# Patient Record
Sex: FEMALE | NOT HISPANIC OR LATINO | Employment: OTHER | URBAN - METROPOLITAN AREA
[De-identification: names, ages, dates, MRNs, and addresses within clinical notes are randomized per-mention and may not be internally consistent; named-entity substitution may affect disease eponyms.]

---

## 2023-08-11 ENCOUNTER — LAB (OUTPATIENT)
Dept: LAB | Facility: HOSPITAL | Age: 63
End: 2023-08-11
Payer: MEDICARE

## 2023-08-11 ENCOUNTER — OFFICE VISIT (OUTPATIENT)
Dept: NEUROLOGY | Facility: CLINIC | Age: 63
End: 2023-08-11
Payer: MEDICARE

## 2023-08-11 VITALS
HEIGHT: 68 IN | WEIGHT: 110.4 LBS | SYSTOLIC BLOOD PRESSURE: 141 MMHG | BODY MASS INDEX: 16.73 KG/M2 | DIASTOLIC BLOOD PRESSURE: 80 MMHG | HEART RATE: 63 BPM | OXYGEN SATURATION: 100 %

## 2023-08-11 DIAGNOSIS — I67.82 CEREBRAL ISCHEMIA: ICD-10-CM

## 2023-08-11 DIAGNOSIS — M32.9 LUPUS: Primary | ICD-10-CM

## 2023-08-11 DIAGNOSIS — Z11.59 ENCOUNTER FOR SCREENING FOR OTHER VIRAL DISEASES: ICD-10-CM

## 2023-08-11 DIAGNOSIS — I69.30 SEQUELAE, POST-STROKE: ICD-10-CM

## 2023-08-11 DIAGNOSIS — I63.89 OTHER CEREBRAL INFARCTION: ICD-10-CM

## 2023-08-11 DIAGNOSIS — Z11.4 ENCOUNTER FOR SCREENING FOR HUMAN IMMUNODEFICIENCY VIRUS (HIV): ICD-10-CM

## 2023-08-11 DIAGNOSIS — M32.9 LUPUS: ICD-10-CM

## 2023-08-11 PROBLEM — M35.00 H/O SJOGREN'S DISEASE: Status: ACTIVE | Noted: 2023-04-12

## 2023-08-11 PROBLEM — G25.81 RESTLESS LEGS SYNDROME: Status: ACTIVE | Noted: 2023-02-09

## 2023-08-11 PROBLEM — M10.9 GOUT: Status: ACTIVE | Noted: 2023-04-12

## 2023-08-11 PROBLEM — G43.009 MIGRAINE WITHOUT AURA AND WITHOUT STATUS MIGRAINOSUS, NOT INTRACTABLE: Status: ACTIVE | Noted: 2023-02-09

## 2023-08-11 PROBLEM — G35 MULTIPLE SCLEROSIS: Chronic | Status: ACTIVE | Noted: 2021-07-30

## 2023-08-11 PROBLEM — M79.7 FIBROMYALGIA: Status: ACTIVE | Noted: 2023-04-12

## 2023-08-11 PROBLEM — G62.9 NEUROPATHY: Status: ACTIVE | Noted: 2023-04-12

## 2023-08-11 PROBLEM — I63.9 CEREBROVASCULAR ACCIDENT (CVA): Chronic | Status: ACTIVE | Noted: 2021-07-04

## 2023-08-11 PROBLEM — K21.9 GASTROESOPHAGEAL REFLUX DISEASE: Status: ACTIVE | Noted: 2023-02-09

## 2023-08-11 PROBLEM — G56.03 BILATERAL CARPAL TUNNEL SYNDROME: Status: ACTIVE | Noted: 2020-04-13

## 2023-08-11 PROBLEM — Z79.899 HIGH RISK MEDICATION USE: Status: ACTIVE | Noted: 2023-02-09

## 2023-08-11 PROBLEM — D64.9 ANEMIA: Status: ACTIVE | Noted: 2019-10-10

## 2023-08-11 PROBLEM — M25.569 KNEE PAIN: Status: ACTIVE | Noted: 2020-12-14

## 2023-08-11 PROBLEM — M17.11 PRIMARY LOCALIZED OSTEOARTHRITIS OF RIGHT KNEE: Status: ACTIVE | Noted: 2021-04-12

## 2023-08-11 PROBLEM — R59.9 LYMPH NODES ENLARGED: Status: ACTIVE | Noted: 2023-06-08

## 2023-08-11 PROBLEM — G45.1 CAROTID ARTERY INSUFFICIENCY SYNDROME: Status: ACTIVE | Noted: 2021-07-04

## 2023-08-11 PROBLEM — G62.9 POLYNEUROPATHY: Status: ACTIVE | Noted: 2020-04-13

## 2023-08-11 PROBLEM — E53.8 B12 DEFICIENCY: Status: ACTIVE | Noted: 2023-02-09

## 2023-08-11 PROBLEM — M19.90 ARTHRITIS: Status: ACTIVE | Noted: 2020-12-14

## 2023-08-11 PROBLEM — R20.0 BILATERAL HAND NUMBNESS: Status: ACTIVE | Noted: 2023-02-09

## 2023-08-11 LAB
ALBUMIN SERPL-MCNC: 4.5 G/DL (ref 3.5–5.2)
ALBUMIN/GLOB SERPL: 2 G/DL
ALP SERPL-CCNC: 93 U/L (ref 39–117)
ALT SERPL W P-5'-P-CCNC: 21 U/L (ref 1–33)
ANION GAP SERPL CALCULATED.3IONS-SCNC: 8.2 MMOL/L (ref 5–15)
AST SERPL-CCNC: 32 U/L (ref 1–32)
BASOPHILS # BLD AUTO: 0.01 10*3/MM3 (ref 0–0.2)
BASOPHILS NFR BLD AUTO: 0.2 % (ref 0–1.5)
BILIRUB SERPL-MCNC: 0.4 MG/DL (ref 0–1.2)
BUN SERPL-MCNC: 6 MG/DL (ref 8–23)
BUN/CREAT SERPL: 9 (ref 7–25)
CALCIUM SPEC-SCNC: 9.2 MG/DL (ref 8.6–10.5)
CHLORIDE SERPL-SCNC: 109 MMOL/L (ref 98–107)
CHOLEST SERPL-MCNC: 213 MG/DL (ref 0–200)
CO2 SERPL-SCNC: 24.8 MMOL/L (ref 22–29)
CREAT SERPL-MCNC: 0.67 MG/DL (ref 0.57–1)
CRP SERPL-MCNC: <0.3 MG/DL (ref 0–0.5)
DEPRECATED RDW RBC AUTO: 52.9 FL (ref 37–54)
EGFRCR SERPLBLD CKD-EPI 2021: 98.4 ML/MIN/1.73
EOSINOPHIL # BLD AUTO: 0.15 10*3/MM3 (ref 0–0.4)
EOSINOPHIL NFR BLD AUTO: 3.7 % (ref 0.3–6.2)
ERYTHROCYTE [DISTWIDTH] IN BLOOD BY AUTOMATED COUNT: 14.5 % (ref 12.3–15.4)
ERYTHROCYTE [SEDIMENTATION RATE] IN BLOOD: 2 MM/HR (ref 0–30)
GLOBULIN UR ELPH-MCNC: 2.2 GM/DL
GLUCOSE SERPL-MCNC: 81 MG/DL (ref 65–99)
HBV SURFACE AB SER RIA-ACNC: NORMAL
HBV SURFACE AG SERPL QL IA: NORMAL
HCT VFR BLD AUTO: 38 % (ref 34–46.6)
HCV AB SER DONR QL: NORMAL
HDLC SERPL-MCNC: 93 MG/DL (ref 40–60)
HGB BLD-MCNC: 12.1 G/DL (ref 12–15.9)
HIV1+2 AB SER QL: NORMAL
IMM GRANULOCYTES # BLD AUTO: 0.01 10*3/MM3 (ref 0–0.05)
IMM GRANULOCYTES NFR BLD AUTO: 0.2 % (ref 0–0.5)
LDLC SERPL CALC-MCNC: 106 MG/DL (ref 0–100)
LDLC/HDLC SERPL: 1.11 {RATIO}
LYMPHOCYTES # BLD AUTO: 1.21 10*3/MM3 (ref 0.7–3.1)
LYMPHOCYTES NFR BLD AUTO: 29.5 % (ref 19.6–45.3)
MCH RBC QN AUTO: 31.5 PG (ref 26.6–33)
MCHC RBC AUTO-ENTMCNC: 31.8 G/DL (ref 31.5–35.7)
MCV RBC AUTO: 99 FL (ref 79–97)
MONOCYTES # BLD AUTO: 0.32 10*3/MM3 (ref 0.1–0.9)
MONOCYTES NFR BLD AUTO: 7.8 % (ref 5–12)
NEUTROPHILS NFR BLD AUTO: 2.4 10*3/MM3 (ref 1.7–7)
NEUTROPHILS NFR BLD AUTO: 58.6 % (ref 42.7–76)
PLATELET # BLD AUTO: 247 10*3/MM3 (ref 140–450)
PMV BLD AUTO: 9.9 FL (ref 6–12)
POTASSIUM SERPL-SCNC: 3.6 MMOL/L (ref 3.5–5.2)
PROT SERPL-MCNC: 6.7 G/DL (ref 6–8.5)
RBC # BLD AUTO: 3.84 10*6/MM3 (ref 3.77–5.28)
SODIUM SERPL-SCNC: 142 MMOL/L (ref 136–145)
TRIGL SERPL-MCNC: 82 MG/DL (ref 0–150)
VLDLC SERPL-MCNC: 14 MG/DL (ref 5–40)
WBC NRBC COR # BLD: 4.1 10*3/MM3 (ref 3.4–10.8)

## 2023-08-11 PROCEDURE — 85652 RBC SED RATE AUTOMATED: CPT

## 2023-08-11 PROCEDURE — 86803 HEPATITIS C AB TEST: CPT

## 2023-08-11 PROCEDURE — 80053 COMPREHEN METABOLIC PANEL: CPT

## 2023-08-11 PROCEDURE — 85025 COMPLETE CBC W/AUTO DIFF WBC: CPT

## 2023-08-11 PROCEDURE — 86706 HEP B SURFACE ANTIBODY: CPT

## 2023-08-11 PROCEDURE — 87340 HEPATITIS B SURFACE AG IA: CPT

## 2023-08-11 PROCEDURE — 86362 MOG-IGG1 ANTB CBA EACH: CPT

## 2023-08-11 PROCEDURE — 86704 HEP B CORE ANTIBODY TOTAL: CPT

## 2023-08-11 PROCEDURE — G0432 EIA HIV-1/HIV-2 SCREEN: HCPCS

## 2023-08-11 PROCEDURE — 86140 C-REACTIVE PROTEIN: CPT

## 2023-08-11 PROCEDURE — 36415 COLL VENOUS BLD VENIPUNCTURE: CPT

## 2023-08-11 PROCEDURE — 86053 AQAPRN-4 ANTB FLO CYTMTRY EA: CPT

## 2023-08-11 PROCEDURE — 80061 LIPID PANEL: CPT

## 2023-08-11 RX ORDER — ALLOPURINOL 300 MG/1
1 TABLET ORAL DAILY
COMMUNITY

## 2023-08-11 RX ORDER — GABAPENTIN 300 MG/1
300 CAPSULE ORAL DAILY
COMMUNITY
Start: 2023-06-08

## 2023-08-11 RX ORDER — PYRIDOSTIGMINE BROMIDE 60 MG/1
180 TABLET ORAL DAILY
COMMUNITY
Start: 2023-06-08

## 2023-08-11 RX ORDER — ATORVASTATIN CALCIUM 20 MG/1
20 TABLET, FILM COATED ORAL DAILY
Qty: 30 TABLET | Refills: 11 | Status: SHIPPED | OUTPATIENT
Start: 2023-08-11 | End: 2024-08-10

## 2023-08-11 RX ORDER — TEMAZEPAM 15 MG/1
15 CAPSULE ORAL NIGHTLY PRN
COMMUNITY

## 2023-08-11 RX ORDER — PANTOPRAZOLE SODIUM 40 MG/1
2 TABLET, DELAYED RELEASE ORAL DAILY
COMMUNITY

## 2023-08-11 RX ORDER — HYDROXYCHLOROQUINE SULFATE 200 MG/1
1 TABLET, FILM COATED ORAL DAILY
COMMUNITY

## 2023-08-11 NOTE — PROGRESS NOTES
This is a very pleasant 63-year-old female who I am seeing today in initial consultation for lupus and a previous stroke in the brain.  She had neurologic involvement in her lupus as well as kidney disease.  She is currently on methotrexate and rituximab.  She states she has been on these medications for the last 2 years and is tolerated these medications well.  She states that about a year ago she started develop some numbness in both of her distal lower extremities and was diagnosed with a peripheral neuropathy.  This was thought to be secondary to her lupus.  Besides the peripheral neuropathy she states she has been relatively stable on her rituximab and methotrexate.  She is here today for second opinion regarding disease modifying therapy.        Past medical history    Lupus    Sjogren syndrome    B12 deficiency    Gout    Arthritis    Previous CVA    Bilateral carpal tunnel        Social history    No alcohol tobacco or illicit drug use        Family history    There is no family history of autoimmune disease or neuro immune conditions but she does have a strong family history of dementia including her father and multiple uncles and aunts that had dementia through her father side        She on examination today she is alert and oriented x 3.  Her speech is fluent there is no dysarthria no aphasia.  Cranial nerves II through XII are intact she has 5 out of 5 strength in bilateral upper and lower extremities with normal tone and bulk there are no upper motor neuron signs no ataxia finger-nose or heel-to-shin.  Rapid alternating movements including hand fisting and finger tapping show normal speed and amplitude.  Sensations intact in her upper extremities she has decreased sensation in both of her distal lower extremities this improves approximately above the knee.  She was able to walk 25 feet and 4 seconds.        I was able to review her most recent MRI of the brain which showed hyperintense lesion in the  right frontal parietal region this is consistent with an old ischemic infarct I do not see any active restricted diffusion I reviewed her lumbar puncture results.  She had 2 nucleated cells her glucose was 48 protein was 45 she had 7 oligoclonal bands in the spinal fluid although there was no match serum sample.  She had a West Nile IgG that was positive although her IgM was negative.  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  I agree with continuing the rituximab and the methotrexate for now.  I am going to check routine blood work today.  I am going to set her up for repeat imaging in 3 months including an MRI of the brain and spinal cord as well as a vascular imaging and an echo.  She will continue aspirin.  I would like her to start Lipitor.  She will follow-up with me at the conclusion of testing or sooner if there is any problems in the interim.  She will continue to follow-up with rheumatology.  I am happy to discuss these results with her primary rheumatologist and Dr. Felder her neurologist.        I spent 1 hour in patient care.        Thank you again for involving me in the care of this patient please not hesitate to contact me with additional questions.

## 2023-08-13 LAB — HBV CORE AB SERPL QL IA: NEGATIVE

## 2023-08-14 ENCOUNTER — TELEPHONE (OUTPATIENT)
Dept: NEUROLOGY | Facility: CLINIC | Age: 63
End: 2023-08-14

## 2023-08-14 NOTE — TELEPHONE ENCOUNTER
Caller: TROY  Relationship to Patient: SELF  Phone Number: [phone]    Reason for Call: SHE REVIEWED THE OV NOTE FROM HER LAST VISIT SHE WANTS TO MAKE SURE THAT HER AND PROVIDER ARE ON THE SAME PAGE. HER CONCERNS ARE AS FOLLOWS:    - IS SHE BEING TREATED FOR MS (DX'D IN JULY 2021) AND MG (DX'D OVER 18 YEARS AGO)    - PT HAS BEEN TREATED FOR NEUROPATHY FOR THE PAST 18 YEARS NOT TWO YEARS.    -PT HAS BEEN ON methotrexate FOR AT LEAST 18 YEARS FOR HER LUPUS.     - SHE STATES SHE DOES A FAMILY HISTORY OF MS - (TWO COUSINS AND ONE HAS PASSED) AND FAMILY HISTORY OF LUPUS ON FATHER SIDE(AN AUNT AND TWO COUSIN)

## 2023-08-14 NOTE — TELEPHONE ENCOUNTER
PLEASE SEE MESSAGE BELOW.     PT REVIEWED YOUR OV NOTES ON MYCHART AND NOTED SOME INCONSISTENCIES WITH YOUR DOCUMENTATION AND HER HISTORY.     -CLR

## 2023-08-15 LAB — MOG AB SER QL CBA IFA: NEGATIVE

## 2023-08-18 LAB — AQP4 H2O CHANNEL IGG SERPL QL: NEGATIVE

## 2023-08-21 ENCOUNTER — TELEPHONE (OUTPATIENT)
Dept: NEUROLOGY | Facility: CLINIC | Age: 63
End: 2023-08-21
Payer: MEDICARE

## 2023-08-21 NOTE — TELEPHONE ENCOUNTER
Working on overdue results. Pts imaging studies appeared.     Reached out to pt, she stated she had 3 scans done on 8/10 and has the other 3 scheduled for 8/25.     Called [facility]. Spoke with Medical records staff Mago. She confirmed pt had MRI Brain, MRI angiogram head and neck completed on 8/10 and on 8/25 pt is scheduled for Mri c and t spine and echo.     Mago is faxing over reports for completed scans. She also made herself a note and will fax over reports of scans being on on 8/25 once completed.     -clr

## 2023-08-30 ENCOUNTER — TELEPHONE (OUTPATIENT)
Dept: NEUROLOGY | Facility: CLINIC | Age: 63
End: 2023-08-30

## 2023-08-30 ENCOUNTER — OFFICE VISIT (OUTPATIENT)
Dept: NEUROLOGY | Facility: CLINIC | Age: 63
End: 2023-08-30
Payer: MEDICARE

## 2023-08-30 VITALS
HEART RATE: 71 BPM | HEIGHT: 68 IN | DIASTOLIC BLOOD PRESSURE: 71 MMHG | OXYGEN SATURATION: 100 % | SYSTOLIC BLOOD PRESSURE: 146 MMHG | BODY MASS INDEX: 16.61 KG/M2 | WEIGHT: 109.6 LBS

## 2023-08-30 DIAGNOSIS — M32.9 LUPUS: Primary | ICD-10-CM

## 2023-08-30 RX ORDER — TERBINAFINE HYDROCHLORIDE 250 MG/1
1 TABLET ORAL DAILY
COMMUNITY
Start: 2023-08-12

## 2023-08-30 RX ORDER — ASPIRIN 81 MG/1
81 TABLET ORAL DAILY
COMMUNITY

## 2023-08-30 NOTE — TELEPHONE ENCOUNTER
----- Message from Chay Aleman DO sent at 8/30/2023  1:56 PM EDT -----  She needs to do asa 81 mg as long as she is not on any other antiplatelet or anticoagulation

## 2023-08-30 NOTE — PROGRESS NOTES
This is a very pleasant 63-year-old female who had seen in initial consultation for multiple sclerosis and lupus.  She was diagnosed with both diseases and started on rituximab and methotrexate.  It was unclear to me whether she had both diseases or if this was all secondary to her neuro lupus.  She is here today to review imaging she reports no new neurologic issues since I saw her last.    She had a negative neuromyelitis optica antibody and negative MOG.  Her sed rate and CRP were both unremarkable.      I reviewed the MRI of the head neck which shows no major vascular occlusion.  I reviewed her echocardiogram which showed no thrombus.  Her MRI of the brain showed no acute intercranial abnormalities there is an area of encephalomalacia in the right frontal parietal region consistent with an old infarct and redemonstration of mild presumed small vessel ischemic disease.  Her MRI of the cervical cord showed a broad-based disc osteophyte complex with spurring with mild to moderate right-sided neural foraminal narrowing slightly worse than the prior exam her MRI of the thoracic cord showed acute superior endplate compression fractures at T9 and T12 in with minimal retropulsion no canal stenosis no lesions within the cord itself.        Unfortunately I was not able to review any of these images but only the reports she did bring a disc to today's visit but they were not able to open on our system I am still trying to get them open.        In summary this very pleasant 63-year-old female with lupus and not sure if she has multiple sclerosis as well I would like to see the imaging before making any final decisions.  This possibly could be all due to her neuro lupus.  Regardless she is on rituximab which has brought immunological coverage she should continue the rituximab and methotrexate.  She will continue aspirin and Lipitor.  I offered her neurosurgical consultation for the degenerative disc disease in the  compression fracture she is also ready followed for the compression fractures she would prefer not to consider any surgical intervention at this point.  She will follow-up w in 6 months or sooner if there is any problems in the interim.          I spent 40 minutes in patient care.

## 2024-06-17 NOTE — TELEPHONE ENCOUNTER
Rx Refill Note  Requested Prescriptions     Pending Prescriptions Disp Refills    Dietary Management Product (Rheumate) capsule 180 capsule 0     Sig: Take 1 capsule by mouth 2 (Two) Times a Day.      Last office visit with prescribing clinician: 02/28/2024     Last telemedicine visit with prescribing clinician: Visit date not found   Next office visit with prescribing clinician: 7/8/2024                         Would you like a call back once the refill request has been completed: [] Yes [] No    If the office needs to give you a call back, can they leave a voicemail: [] Yes [] No    Tavares Carter MA  06/17/24, 11:31 EDT    Last labs 12/21/22

## 2024-06-17 NOTE — TELEPHONE ENCOUNTER
Pt sent message via [employer] asking for a rx for Rheumate to be sent to [facility]. -MARISELA Blanco

## 2024-06-22 RX ORDER — ME-TETRAHYDROFOLATE/B12/HRB236 1-1-500 MG
1 CAPSULE ORAL 2 TIMES DAILY
Qty: 180 CAPSULE | Refills: 1 | Status: SHIPPED | OUTPATIENT
Start: 2024-06-22

## 2024-07-05 PROBLEM — M32.9 LUPUS: Status: ACTIVE | Noted: 2024-07-05

## 2024-07-05 PROBLEM — IMO0002 LUPUS: Status: ACTIVE | Noted: 2024-07-05

## 2024-07-08 ENCOUNTER — LAB (OUTPATIENT)
Facility: HOSPITAL | Age: 64
End: 2024-07-08
Payer: MEDICARE

## 2024-07-08 ENCOUNTER — OFFICE VISIT (OUTPATIENT)
Age: 64
End: 2024-07-08
Payer: MEDICARE

## 2024-07-08 VITALS
HEIGHT: 68 IN | BODY MASS INDEX: 16.37 KG/M2 | TEMPERATURE: 97.7 F | DIASTOLIC BLOOD PRESSURE: 68 MMHG | SYSTOLIC BLOOD PRESSURE: 120 MMHG | WEIGHT: 108 LBS | HEART RATE: 68 BPM

## 2024-07-08 DIAGNOSIS — G35 MULTIPLE SCLEROSIS: Chronic | ICD-10-CM

## 2024-07-08 DIAGNOSIS — M79.7 FIBROMYALGIA: ICD-10-CM

## 2024-07-08 DIAGNOSIS — M06.09 RHEUMATOID ARTHRITIS OF MULTIPLE SITES WITH NEGATIVE RHEUMATOID FACTOR: ICD-10-CM

## 2024-07-08 DIAGNOSIS — R21 RASH: ICD-10-CM

## 2024-07-08 DIAGNOSIS — M32.9 LUPUS: ICD-10-CM

## 2024-07-08 DIAGNOSIS — Z79.52 LONG TERM (CURRENT) USE OF SYSTEMIC STEROIDS: ICD-10-CM

## 2024-07-08 DIAGNOSIS — G89.29 CHRONIC RIGHT-SIDED LOW BACK PAIN WITHOUT SCIATICA: ICD-10-CM

## 2024-07-08 DIAGNOSIS — M06.09 RHEUMATOID ARTHRITIS OF MULTIPLE SITES WITH NEGATIVE RHEUMATOID FACTOR: Primary | ICD-10-CM

## 2024-07-08 DIAGNOSIS — I73.00 RAYNAUD'S DISEASE WITHOUT GANGRENE: ICD-10-CM

## 2024-07-08 DIAGNOSIS — Z79.899 HIGH RISK MEDICATION USE: ICD-10-CM

## 2024-07-08 DIAGNOSIS — M54.50 CHRONIC RIGHT-SIDED LOW BACK PAIN WITHOUT SCIATICA: ICD-10-CM

## 2024-07-08 DIAGNOSIS — R53.83 OTHER FATIGUE: ICD-10-CM

## 2024-07-08 DIAGNOSIS — M35.00 SJOGREN'S SYNDROME, WITH UNSPECIFIED ORGAN INVOLVEMENT: ICD-10-CM

## 2024-07-08 DIAGNOSIS — Q78.2 OSTEOPETROSIS: ICD-10-CM

## 2024-07-08 LAB
ALBUMIN SERPL-MCNC: 4.4 G/DL (ref 3.5–5.2)
ALBUMIN/GLOB SERPL: 1.9 G/DL
ALP SERPL-CCNC: 74 U/L (ref 39–117)
ALT SERPL W P-5'-P-CCNC: 27 U/L (ref 1–33)
ANION GAP SERPL CALCULATED.3IONS-SCNC: 12 MMOL/L (ref 5–15)
AST SERPL-CCNC: 43 U/L (ref 1–32)
BASOPHILS # BLD AUTO: 0.02 10*3/MM3 (ref 0–0.2)
BASOPHILS NFR BLD AUTO: 0.5 % (ref 0–1.5)
BILIRUB SERPL-MCNC: 0.5 MG/DL (ref 0–1.2)
BUN SERPL-MCNC: 5 MG/DL (ref 8–23)
BUN/CREAT SERPL: 6.8 (ref 7–25)
CALCIUM SPEC-SCNC: 9.5 MG/DL (ref 8.6–10.5)
CHLORIDE SERPL-SCNC: 106 MMOL/L (ref 98–107)
CO2 SERPL-SCNC: 23 MMOL/L (ref 22–29)
CREAT SERPL-MCNC: 0.74 MG/DL (ref 0.57–1)
DEPRECATED RDW RBC AUTO: 46.9 FL (ref 37–54)
EGFRCR SERPLBLD CKD-EPI 2021: 90.5 ML/MIN/1.73
EOSINOPHIL # BLD AUTO: 0.24 10*3/MM3 (ref 0–0.4)
EOSINOPHIL NFR BLD AUTO: 6.2 % (ref 0.3–6.2)
ERYTHROCYTE [DISTWIDTH] IN BLOOD BY AUTOMATED COUNT: 13.5 % (ref 12.3–15.4)
GLOBULIN UR ELPH-MCNC: 2.3 GM/DL
GLUCOSE SERPL-MCNC: 79 MG/DL (ref 65–99)
HCT VFR BLD AUTO: 38.3 % (ref 34–46.6)
HGB BLD-MCNC: 12.6 G/DL (ref 12–15.9)
IMM GRANULOCYTES # BLD AUTO: 0.01 10*3/MM3 (ref 0–0.05)
IMM GRANULOCYTES NFR BLD AUTO: 0.3 % (ref 0–0.5)
LYMPHOCYTES # BLD AUTO: 1.27 10*3/MM3 (ref 0.7–3.1)
LYMPHOCYTES NFR BLD AUTO: 32.9 % (ref 19.6–45.3)
MCH RBC QN AUTO: 32.1 PG (ref 26.6–33)
MCHC RBC AUTO-ENTMCNC: 32.9 G/DL (ref 31.5–35.7)
MCV RBC AUTO: 97.7 FL (ref 79–97)
MONOCYTES # BLD AUTO: 0.42 10*3/MM3 (ref 0.1–0.9)
MONOCYTES NFR BLD AUTO: 10.9 % (ref 5–12)
NEUTROPHILS NFR BLD AUTO: 1.9 10*3/MM3 (ref 1.7–7)
NEUTROPHILS NFR BLD AUTO: 49.2 % (ref 42.7–76)
NRBC BLD AUTO-RTO: 0 /100 WBC (ref 0–0.2)
PLATELET # BLD AUTO: 224 10*3/MM3 (ref 140–450)
PMV BLD AUTO: 11.1 FL (ref 6–12)
POTASSIUM SERPL-SCNC: 3.9 MMOL/L (ref 3.5–5.2)
PROT SERPL-MCNC: 6.7 G/DL (ref 6–8.5)
RBC # BLD AUTO: 3.92 10*6/MM3 (ref 3.77–5.28)
SODIUM SERPL-SCNC: 141 MMOL/L (ref 136–145)
WBC NRBC COR # BLD AUTO: 3.86 10*3/MM3 (ref 3.4–10.8)

## 2024-07-08 PROCEDURE — 85025 COMPLETE CBC W/AUTO DIFF WBC: CPT

## 2024-07-08 PROCEDURE — 80053 COMPREHEN METABOLIC PANEL: CPT

## 2024-07-08 RX ORDER — METHOTREXATE 25 MG/ML
10 INJECTION, SOLUTION INTRA-ARTERIAL; INTRAMUSCULAR; INTRAVENOUS WEEKLY
Qty: 4 ML | Refills: 4 | Status: SHIPPED | OUTPATIENT
Start: 2024-07-08

## 2024-07-08 RX ORDER — SYRINGE AND NEEDLE,INSULIN,1ML 30 G X1/2"
1 SYRINGE, EMPTY DISPOSABLE MISCELLANEOUS WEEKLY
Qty: 10 EACH | Refills: 3 | Status: SHIPPED | OUTPATIENT
Start: 2024-07-08

## 2024-07-08 RX ORDER — HYDROXYCHLOROQUINE SULFATE 200 MG/1
200 TABLET, FILM COATED ORAL DAILY
Qty: 60 TABLET | Refills: 3 | Status: SHIPPED | OUTPATIENT
Start: 2024-07-08

## 2024-07-08 RX ORDER — CYCLOBENZAPRINE HCL 10 MG
10 TABLET ORAL 3 TIMES DAILY
Qty: 90 TABLET | Refills: 4 | Status: SHIPPED | OUTPATIENT
Start: 2024-07-08

## 2024-07-08 RX ORDER — COLCHICINE 0.6 MG/1
0.6 TABLET ORAL 2 TIMES DAILY
Qty: 60 TABLET | Refills: 3 | Status: SHIPPED | OUTPATIENT
Start: 2024-07-08

## 2024-07-08 RX ORDER — DICLOFENAC SODIUM TOPICAL GEL, 1% 10 MG/G
GEL TOPICAL
COMMUNITY
Start: 2024-04-11

## 2024-07-08 NOTE — ASSESSMENT & PLAN NOTE
Patient receives IM steroids Q 2-4 months for years.  She has occasional taken rounds of IV steroids.   Her last round of steroids were in October 2019.  She is trying not to take steroids but does not feel well; Aches all over.  Positive fatigue.  In the past she has not tolerated oral steroids. I do have some concern that she could have secondary adrenal insufficiency.  Chronic steroid can delay healing of fractures or wounds; Can increase blood count and make her more predisposed to thrush/yeast infections.  AM Cortisol in 12/19  Normal.    Plan:  Last steroid injection 1/24 (pneumonia)

## 2024-07-08 NOTE — ASSESSMENT & PLAN NOTE
Stress fracture L foot 2/19.  Started  Prolia with pcp June 2019.   She was off Prolia for 1 year, she restarted this and suffered some compression fractures. Prolia should be indefinite because if she d/c this DEXA will decrease.   DEXA 10/23 T score -2.9 lumbar spine, -3.3 L femoral neck. Consistent with severe osteoporosis of the femoral neck and osteoporosis of the spine. No comparisons or statistical analysis from 2021. ? worsening of the femoral neck    Plan:  Continue Prolia. She was noted to have 2 sacral fractures 12/23.  Calcium 800mg  + D 2000 per day.  Weight bearing exercise- Patient walking.  Dexa due 10/2025 with pcp  Status post evaluation [facility]

## 2024-07-08 NOTE — ASSESSMENT & PLAN NOTE
Rituxan and mtx - well tolerated and effective.   S/p Covid vaccine + 2 boosters. S/p Bivalent vaccine. Fall 23 booster.   10/21 Negative Hepatitis.   12/22 Qfn neg.  CXR 12/21- Emphysema.          Plan:  Eye Check Q 12 mos with OCT for Plaquenil- 8/23  Cbc,Cmp Q 2 months - 2/24 WBC 2.9, . 1/24 WBC 2.8, HCT 35.6. 12/23 WBC 3.9(3.4), D 38      Would hold methotrexate/Rituxan for any infection or illness, Seek treatment and when well and illness is resolved you may restart medication.

## 2024-07-08 NOTE — PROGRESS NOTES
[facility] [place] [facility] Office Follow Up Visit     Office Follow Up      Date: 07/08/2024   Patient Name: Mesha Najera  MRN: [id number]  YOB: 1960    Referring Physician: Anne Pinto MD     Chief Complaint   Patient presents with    Arthritis       History of Present Illness: Mesha Najera is a 64 y.o. female who is here today for follow up on lupus  History of Present Illness  The patient is back for follow-up of multiple medical problems.    The patient experiences an hour-long morning stiffness, with a pain score of 5 out of 10 in her knees, shoulders, and back. She is currently recovering from sacral fractures, which have been challenging for her. Despite not receiving a specific timeframe for recovery, she consulted with Dr. Meza, who conducted blood work, which yielded normal results. However, her calcium levels were found to be elevated, prompting her to discontinue oral calcium intake and continue with Prolia. She received a Prolia injection on 07/01/2023 at the [facility] and is due for Rituxan in 08/2024. She reports that Rituxan has significantly improved her Sjogren's syndrome, as evidenced by less dryness in her eyes, a symptom she has not experienced in over 20 years. Dr. Felder also suggested that Rituxan could potentially alleviate her myasthenia gravis. She received a knee gel injection, which resulted in an elevated white blood cell count to 6.1. This increase was attributed to her steroid use. She has been receiving gel injections every 6 months for over 2 years, but the following day, she was unable to bend her knee due to significant swelling. Dr. Vega attributed her elevated white blood cell count to immunosuppressive diseases and her immunosuppressant medications. She has a history of toenail fungus, which was managed with terbinafine, but discontinued 1.5 months ago due to toe discoloration. She experiences pain in her hands, knees,  and ankles, with the left ankle consistently swelling. She also experiences pain that radiates down her left leg to her ankle. She experiences occasional breakouts, dry eyes, and dry mouth, for which she uses Rheumate. She has experienced chest pain/pleurisy for the past few days, but avoids steroid injections due to oral thrush. She struggles with Raynaud's phenomenon in her hands. Her methotrexate dosage was reduced at her last visit and she has begun to notice a difference in her joint pain since starting the Rituxan. She has a follow-up appointment with Dr. Vega in 6 months and a neurologist in 08/2024. She has been taking Plaquenil once daily. She occasionally experiences a tremor in her fingers. She takes Flexeril before bedtime. [facility] longer [facility], but was advised to return if required. She continues to experience numbness in her legs and occasional muscle spasms in her feet. She consulted a dermatologist in [place] for a rash, who attributed it to her lupus.       Result Review :        Results  Laboratory Studies  Calcium was elevated. White blood cell count was 6.1 in June/2024.          Subjective     Allergies   Allergen Reactions    Milnacipran Hcl [Milnacipran] Unknown - High Severity    Penicillins Swelling    Arava [Leflunomide] Rash    Cellcept [Mycophenolate] Rash    Dienogest Rash     SIBILLA    Pregabalin Rash    Sulfa Antibiotics Rash         Current Outpatient Medications:     aspirin 81 MG EC tablet, Take 1 tablet by mouth Daily., Disp: , Rfl:     colchicine 0.6 MG tablet, Take 1 tablet by mouth 2 (Two) Times a Day., Disp: 60 tablet, Rfl: 3    cyclobenzaprine (FLEXERIL) 10 MG tablet, Take 1 tablet by mouth 3 times a day., Disp: 90 tablet, Rfl: 4    Dietary Management Product (Rheumate) capsule, Take 1 capsule by mouth 2 (Two) Times a Day., Disp: 180 capsule, Rfl: 1    FT Arthritis Pain 1 % gel gel, apply two grams topically TO affected AREA FOUR TIMES DAILY, Disp:  ", Rfl:     gabapentin (NEURONTIN) 300 MG capsule, Take 1 capsule by mouth Daily., Disp: , Rfl:     hydroxychloroquine (PLAQUENIL) 200 MG tablet, Take 1 tablet by mouth Daily., Disp: 60 tablet, Rfl: 3    Insulin Syringe/Needle (B-D INS SYR MICROFINE 1CC/28G) 28G X 1/2\" 1 ML misc, Use 1 syringe 1 (One) Time Per Week. 1 syringe as directed for mtx injection, Disp: 10 each, Rfl: 3    pantoprazole (PROTONIX) 40 MG EC tablet, Take 2 tablets by mouth Daily., Disp: , Rfl:     pyridostigmine (MESTINON) 60 MG tablet, Take 3 tablets by mouth Daily., Disp: , Rfl:     RITUXIMAB IV, Infuse  into a venous catheter Every 4 (Four) Months. 2 infusions q 4 months, infusions are 2 weeks apart, Disp: , Rfl:     riTUXimab-pvvr (Ruxience) 100 MG/10ML solution injection, Infuse  into a venous catheter See Admin Instructions. Administer RUXIENCE 1000 every 4 months, Disp: , Rfl:     temazepam (RESTORIL) 15 MG capsule, Take 1 capsule by mouth At Night As Needed for Sleep., Disp: , Rfl:     allopurinol (ZYLOPRIM) 300 MG tablet, Take 1 tablet by mouth Daily. (Patient not taking: Reported on 7/8/2024), Disp: , Rfl:     Methotrexate Sodium 50 MG/2ML injection, Inject 0.4 mL under the skin into the appropriate area as directed 1 (One) Time Per Week., Disp: 4 mL, Rfl: 4    terbinafine (lamiSIL) 250 MG tablet, Take 1 tablet by mouth Daily., Disp: , Rfl:     Past Medical History:   Diagnosis Date    Acute cervical radiculopathy     c8    Anemia     01/26/2022 -Sp iron infusion. Dr. Vega.    Chondrocalcinosis     10/01/2019 -Bilateral knees by XR 6/19.    Closed left tibial fracture     Compression fracture of body of thoracic vertebra     t9-11- compressiob fracture x3    Condition not found     \"si joint\"    COVID 09/2023    CTS (carpal tunnel syndrome)     R    Dupuytren contracture     Fatty liver     [facility]  06/20/2023 -Erika New    Fibromyalgia     Fracture, foot     lt    Gout     Inflammatory arthritis     Knee pain     Lupus     " MS (multiple sclerosis) 07/2021    Myasthenia gravis     Oral lesion     Osteoporosis     Pain in right wrist     Pelvic fracture     x2    Pneumonia     TURNER 04/06/2018 - 3/15/18- [facility].    Raynaud's disease     Renal insufficiency     Sjogren's syndrome     Stroke     Strong FH of clots and strokes in aunts and father. She has two strokes on MRI. RPR , Factor V Leiden. Anticardiolipin AB, Beta 2 Glycoprotein, MTHFR  negative 9/21    Thrush     Thyroid nodule     10/31/2018 -Several Nodules/Lesions by CT scan. S/p Eval with Dr. Kasi Saab who said he would repeat her CT in 6 months.    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        Past Surgical History:   Procedure Laterality Date    CARPAL TUNNEL RELEASE Right 2018    CATARACT EXTRACTION  2020    may/kunal    DENTAL PROCEDURE      implants    MENISCECTOMY Right 06/2019       Family History   Problem Relation Age of Onset    Multiple sclerosis Other         TURNER 10/02/2017 -Two cousins.    Autoimmune disease Other         Social History     Socioeconomic History    Marital status:     Number of children: 2   Tobacco Use    Smoking status: Never     Passive exposure: Past    Smokeless tobacco: Never   Vaping Use    Vaping status: Never Used   Substance and Sexual Activity    Alcohol use: Not Currently    Drug use: Never    Sexual activity: Defer       Review of Systems   Constitutional:  Negative for fatigue and fever.   HENT:  Negative for mouth sores, nosebleeds, swollen glands and trouble swallowing.         Dry mouth  Dry eyes  Dysphagia  Jaw pain     Eyes:  Negative for blurred vision, double vision, photophobia, pain and visual disturbance.   Respiratory:  Negative for apnea, cough, choking and shortness of breath.    Cardiovascular:  Positive for chest pain and leg swelling. Negative for palpitations.        Leg cramping  Raynauds     Gastrointestinal:  Positive for GERD.  "Negative for abdominal pain, blood in stool, constipation, diarrhea, nausea and vomiting.   Endocrine: Positive for heat intolerance. Negative for cold intolerance, polydipsia, polyphagia and polyuria.   Genitourinary:  Negative for difficulty urinating, dysuria, genital sores, hematuria and urinary incontinence.   Musculoskeletal:  Positive for back pain, joint swelling, myalgias, neck pain and neck stiffness. Negative for arthralgias, gait problem and bursitis.        Joint pain  Joint tenderness  Low back pain  Morning stiffness     Skin:  Positive for rash and bruise.   Allergic/Immunologic: Negative for environmental allergies and food allergies.   Neurological:  Positive for weakness, numbness and headache. Negative for dizziness, tremors, seizures, syncope, memory problem and confusion.   Hematological:  Negative for adenopathy. Does not bruise/bleed easily.   Psychiatric/Behavioral:  Negative for sleep disturbance, suicidal ideas, depressed mood and stress. The patient is not nervous/anxious.         I have reviewed and updated the patient's chief complaint, history of present illness, review of systems, past medical history, surgical history, family history, social history, medications and allergy list as appropriate.     Objective    Vital Signs:   Vitals:    07/08/24 1114   BP: 120/68   Pulse: 68   Temp: 97.7 °F (36.5 °C)   Weight: 49 kg (108 lb)   Height: 172.7 cm (68\")   PainSc:   5   PainLoc: Knee  Comment: shoulders and back     Mesha Najera reports a pain score of 5.  Given her pain assessment as noted, treatment options were discussed and the following options were decided upon as a follow-up plan to address the patient's pain: See plan below      Body mass index is 16.42 kg/m².        Physical Exam   Physical Exam  Constitutional: Patient is an alert white female, in no acute distress.  HEENT: Pupils are equal and round. Extraocular muscles intact. Oropharynx is negative. The head is atraumatic and " normocephalic.  Pulmonary: Lungs are clear.  Cardiac: Heart is regular without rubs, gallops, or murmurs.  Neck: There is moderate decreased range of motion on the left, but it looks like moderate to severe on the right.   Spine: The spine is tender, especially in the lumbar region. All fibromyalgia syndrome tender points are tender.   Joints: The MCPs are tender bilaterally. First CMC squaring is observed. There is crepitus in the right knee and left knee.   Extremities: There is trace edema in the left lower extremity.  Skin: There is erythema on the dorsal surface of the hands of the MCPs extending out toward the PIP joints that is diffuse.  Neuro: Alert and oriented x 3 cranial nerves and motor grossly intact.  Psych: Appropriate mood and affect    Physical Exam  There is currently no information documented on the homunculus. Go to the Rheumatology activity and complete the homunculus joint exam.     Results Review:   Imaging Results (Last 24 Hours)       ** No results found for the last 24 hours. **            Procedures    Assessment / Plan    Assessment/Plan:   Diagnoses and all orders for this visit:    1. Rheumatoid arthritis of multiple sites with negative rheumatoid factor (Primary)  Assessment & Plan:  Intermittent joint pain and swelling.; +RF  S/p Plaquenil, mtx, Arava, imuran, CellCept, Benlysta, now on Rituxan.   Morning stiffness 1 hour.   XR of the knees shows chondrocalcinosis -  Currently on Colcrys.  XR of the pelvic shows DJD of the R hip and SI joints.  Knee pain has been one of her worst complaints - multifactorial- Oa, CPPD, SLE/RA;   Continues to have pain and stiffness. Denies swelling.  + am stiffness.  colchicine helps her knee which is consistent with CPPD / Chondrocalcinosis. She also needs knee replacements.  MCP swelling resolved with plaquenil/ Rituxan.  Hand film in June 2022- Osteoarthritis of multiple joints.  Avoid TNFs due to lupus. Avoid Orencia - chest xr consistent with  COPD.   Stable.    Plan:  Continue Plaquenil  Continue methotrexate.   Continue Rituxan.   Continues on colchicine for CPPD  She is rotating gel and steroid injections for OA of knees with Dr. Bello.  She has had some R TMJ popping and locking. She needs to continue to see her dentist for evaluation.  See plan below      Orders:  -     QuantiFERON TB Gold; Future  -     Comprehensive Metabolic Panel; Standing  -     CBC & Differential; Standing    2. Lupus  Assessment & Plan:  +JOSUE 1:320, + SSA/SSB, RF=31; pleurisy, oral sores(increased w/mtx), Sjogren's, +JOSUE, leucopenia/anemia w/normal bm bx. Raynaud's, inflammatory arthritis. have had steroids, plaquenil, mtx, Arava(rash/fever), imuran (rash fever) CellCept (rash/fever) Rituxan x 1--no relief, Took Benlysta x 1 year without relief.;  ;sx better on mtx/plaquenil with improvement in jt pain/stiffness but still has pleurisy--in past took decadron injxn w/pcp q 4 mos but has not had steroids for quite some time.  On Rheumate Bid - mouth sores currently not a problem.  + symptoms, see HPI.  Joint pain overall has improve on Rituxan- unchanged  Positive photosensitivity, sicca sx, oral ulcers, pleurisy 7/23, Raynauds. Her elbows and ankles swell and are painful    Plan:    Continue Plaquenil.  Continue Mtx and Rituxan.  Continue Rheumate for oral ulcers.  She has had mild leukopenia. We took her off Plaquenil without change. We decreased mtx without change. Probably lupus related, and she saw oncology-see plan below      3. Sjogren's syndrome, with unspecified organ involvement  Assessment & Plan:  Stable---using eye drops and could not get Numoisyn; not much relief w/salagen/evoxac  Biotene gum ;using Xylimelts!!! Has severe eye dryness- Has not tolerated Restasis etc.  Stable    Plan:  See plan below regarding rituximab  Continue oasis spray, Xylimelts, Biotene as they seem to help with sx.          4. Fibromyalgia  Assessment & Plan:  Chronic muscle pain---no relief  w/meds; POOR SLEEP.  Currently stable with intermittent sx.  Decreased gabapentin from Dr. Felder to bedtime.  FMS packet given and discussed - pt to discuss this with PCP.  Stable.-Symptoms wax and wane        Plan:  Continue flexeril.   Heat, ice, massage, Biofreeze.   Water aerobics, chair aerobics, exercising- YouTube. She is walking for exercise.         5. Osteopetrosis  Assessment & Plan:  Stress fracture L foot 2/19.  Started  Prolia with pcp June 2019.   She was off Prolia for 1 year, she restarted this and suffered some compression fractures. Prolia should be indefinite because if she d/c this DEXA will decrease.   DEXA 10/23 T score -2.9 lumbar spine, -3.3 L femoral neck. Consistent with severe osteoporosis of the femoral neck and osteoporosis of the spine. No comparisons or statistical analysis from 2021. ? worsening of the femoral neck    Plan:  Continue Prolia. She was noted to have 2 sacral fractures 12/23.  Calcium 800mg  + D 2000 per day.  Weight bearing exercise- Patient walking.  Dexa due 10/2025 with pcp  Status post evaluation [facility]        6. Raynaud's disease without gangrene  Assessment & Plan:  Flares with cold weather.  She has not tolerated medications for this in the past.  Intermittent.  She complains these turn very dark and then white.      Plan:  Keep Extremities warm with socks and Gloves.  Consider electric socks and gloves.  Hand warmers.  she does not have any digital ulcers and these look stable today.         7. Multiple sclerosis  Assessment & Plan:  Recent diagnosis .  Confirmed Dr. Felder.  He  wanted us to consider med that would treat both her lupus as well as her MS.  S/p Arava - did not tolerate with rash and fever.   Currently on mtx and Rituxan.   Pt does not think MS has changed at this point.       Plan:  Hand weakness and persistent numbness. Electric shocks per pt.   Continue Rituxan.  Dr. Felder would like her to continue medication.         8.  Rash  Assessment & Plan:  Macular erythema of the mcp joints into the fingers.  H/o lupus.  No known history of dermatomyositis.   Currently on Rituxan which would treat lupus.    Plan: As per dermatology          9. Long term (current) use of systemic steroids  Assessment & Plan:  Patient receives IM steroids Q 2-4 months for years.  She has occasional taken rounds of IV steroids.   Her last round of steroids were in October 2019.  She is trying not to take steroids but does not feel well; Aches all over.  Positive fatigue.  In the past she has not tolerated oral steroids. I do have some concern that she could have secondary adrenal insufficiency.  Chronic steroid can delay healing of fractures or wounds; Can increase blood count and make her more predisposed to thrush/yeast infections.  AM Cortisol in 12/19  Normal.    Plan:  Last steroid injection 1/24 (pneumonia)        10. High risk medication use  Assessment & Plan:  Rituxan and mtx - well tolerated and effective.   S/p Covid vaccine + 2 boosters. S/p Bivalent vaccine. Fall 23 booster.   10/21 Negative Hepatitis.   12/22 Qfn neg.  CXR 12/21- Emphysema.          Plan:  Eye Check Q 12 mos with OCT for Plaquenil- 8/23  Cbc,Cmp Q 2 months - 2/24 WBC 2.9, . 1/24 WBC 2.8, HCT 35.6. 12/23 WBC 3.9(3.4), D 38      Would hold methotrexate/Rituxan for any infection or illness, Seek treatment and when well and illness is resolved you may restart medication.     Orders:  -     QuantiFERON TB Gold; Future  -     Comprehensive Metabolic Panel; Standing  -     CBC & Differential; Standing    11. Chronic right-sided low back pain without sciatica  Assessment & Plan:  Lumbar spine MRI 6/2021- Mild R sided neuroforaminal narrowing at L3-4 due to bulging disc. She does have some DDD of L3- S1..    Plan:          12. Other fatigue  -     QuantiFERON TB Gold; Future    Other orders  -     hydroxychloroquine (PLAQUENIL) 200 MG tablet; Take 1 tablet by mouth Daily.  Dispense:  "60 tablet; Refill: 3  -     cyclobenzaprine (FLEXERIL) 10 MG tablet; Take 1 tablet by mouth 3 times a day.  Dispense: 90 tablet; Refill: 4  -     colchicine 0.6 MG tablet; Take 1 tablet by mouth 2 (Two) Times a Day.  Dispense: 60 tablet; Refill: 3  -     Insulin Syringe/Needle (B-D INS SYR MICROFINE 1CC/28G) 28G X 1/2\" 1 ML misc; Use 1 syringe 1 (One) Time Per Week. 1 syringe as directed for mtx injection  Dispense: 10 each; Refill: 3  -     Methotrexate Sodium 50 MG/2ML injection; Inject 0.4 mL under the skin into the appropriate area as directed 1 (One) Time Per Week.  Dispense: 4 mL; Refill: 4          Assessment & Plan  1. Rheumatoid arthritis.  The patient will maintain her current regimen of Plaquenil once daily and methotrexate four tablets per week. It seems as though her Rituxan wears off sooner following a reduction in her methotrexate dosage. The Rituxan will be continued every four months. Colchicine will be continued for CPPD/DIP inflammation.    2. Lupus.  The patient's condition is currently stable. The current regimen of Plaquenil, methotrexate, and Rituxan will be continued. The Rheumate has proven effective for oral ulcers.    3. Leukopenia.  The patient's leukopenia remained stable when she was off Plaquenil. Dr. Vega was unable to discern whether it was lupus or medication-related. This will be monitored. Should the leukopenia worsen, a bone marrow biopsy will be necessary.    4. Sjogren's syndrome.  The condition has improved with Rituxan.    5. Fibromyalgia.  The patient's fibromyalgia remains mildly diffusely tender. The continuation of water aerobics, chair aerobics, any type of exercise, walking, heat, ice, massage, and Biofreeze is recommended. The Flexeril will be continued.    6. Osteoporosis.  The patient will continue with Prolia, which is due until 08/2024. She had to discontinue her calcium due to hypercalcemia. Bone density is not due until 10/2025.    7. Raynaud's disease.  The " condition is currently stable. No changes in the treatment plan are necessary at this time.    8. Multiple sclerosis.  The patient continues to experience numbness and muscle spasms, but Dr. Felder would like her to continue with Rituxan. The patient is advised to avoid steroid injections as much as possible.        Follow Up:   Return in about 4 months (around 11/8/2024).    Time spent 40 minutes: Greater than 50% discussion with this very complex patient with regard to updating her history of symptoms, reviewing tests, discussing treatment plans for each of the diagnoses listed above.     Patient or patient representative verbalized consent prior to the visit for the use of Ambient Listening during the visit with  Ana Hobbs MD for chart documentation.  7/8/2024 13:34  EDT    Ana Hobbs MD  [facility]

## 2024-07-08 NOTE — ASSESSMENT & PLAN NOTE
Stable---using eye drops and could not get Numoisyn; not much relief w/salagen/evoxac  Biotene gum ;using Xylimelts!!! Has severe eye dryness- Has not tolerated Restasis etc.  Stable    Plan:  See plan below regarding rituximab  Continue oasis spray, Xylimelts, Biotene as they seem to help with sx.

## 2024-07-08 NOTE — ASSESSMENT & PLAN NOTE
Chronic muscle pain---no relief w/meds; POOR SLEEP.  Currently stable with intermittent sx.  Decreased gabapentin from Dr. Felder to bedtime.  FMS packet given and discussed - pt to discuss this with PCP.  Stable.-Symptoms wax and wane        Plan:  Continue flexeril.   Heat, ice, massage, Biofreeze.   Water aerobics, chair aerobics, exercising- YouTube. She is walking for exercise.

## 2024-07-08 NOTE — ASSESSMENT & PLAN NOTE
+JOSUE 1:320, + SSA/SSB, RF=31; pleurisy, oral sores(increased w/mtx), Sjogren's, +JOSUE, leucopenia/anemia w/normal bm bx. Raynaud's, inflammatory arthritis. have had steroids, plaquenil, mtx, Arava(rash/fever), imuran (rash fever) CellCept (rash/fever) Rituxan x 1--no relief, Took Benlysta x 1 year without relief.;  ;sx better on mtx/plaquenil with improvement in jt pain/stiffness but still has pleurisy--in past took decadron injxn w/pcp q 4 mos but has not had steroids for quite some time.  On Rheumate Bid - mouth sores currently not a problem.  + symptoms, see HPI.  Joint pain overall has improve on Rituxan- unchanged  Positive photosensitivity, sicca sx, oral ulcers, pleurisy 7/23, Raynauds. Her elbows and ankles swell and are painful    Plan:    Continue Plaquenil.  Continue Mtx and Rituxan.  Continue Rheumate for oral ulcers.  She has had mild leukopenia. We took her off Plaquenil without change. We decreased mtx without change. Probably lupus related, and she saw oncology-see plan below

## 2024-07-08 NOTE — ASSESSMENT & PLAN NOTE
Recent diagnosis .  Confirmed Dr. Felder.  He  wanted us to consider med that would treat both her lupus as well as her MS.  S/p Arava - did not tolerate with rash and fever.   Currently on mtx and Rituxan.   Pt does not think MS has changed at this point.       Plan:  Hand weakness and persistent numbness. Electric shocks per pt.   Continue Rituxan.  Dr. Felder would like her to continue medication.

## 2024-07-08 NOTE — ASSESSMENT & PLAN NOTE
Flares with cold weather.  She has not tolerated medications for this in the past.  Intermittent.  She complains these turn very dark and then white.      Plan:  Keep Extremities warm with socks and Gloves.  Consider electric socks and gloves.  Hand warmers.  she does not have any digital ulcers and these look stable today.

## 2024-07-08 NOTE — ASSESSMENT & PLAN NOTE
Lumbar spine MRI 6/2021- Mild R sided neuroforaminal narrowing at L3-4 due to bulging disc. She does have some DDD of L3- S1..    Plan:

## 2024-07-08 NOTE — ASSESSMENT & PLAN NOTE
Macular erythema of the mcp joints into the fingers.  H/o lupus.  No known history of dermatomyositis.   Currently on Rituxan which would treat lupus.    Plan: As per dermatology

## 2024-07-08 NOTE — ASSESSMENT & PLAN NOTE
Intermittent joint pain and swelling.; +RF  S/p Plaquenil, mtx, Arava, imuran, CellCept, Benlysta, now on Rituxan.   Morning stiffness 1 hour.   XR of the knees shows chondrocalcinosis -  Currently on Colcrys.  XR of the pelvic shows DJD of the R hip and SI joints.  Knee pain has been one of her worst complaints - multifactorial- Oa, CPPD, SLE/RA;   Continues to have pain and stiffness. Denies swelling.  + am stiffness.  colchicine helps her knee which is consistent with CPPD / Chondrocalcinosis. She also needs knee replacements.  MCP swelling resolved with plaquenil/ Rituxan.  Hand film in June 2022- Osteoarthritis of multiple joints.  Avoid TNFs due to lupus. Avoid Orencia - chest xr consistent with COPD.   Stable.    Plan:  Continue Plaquenil  Continue methotrexate.   Continue Rituxan.   Continues on colchicine for CPPD  She is rotating gel and steroid injections for OA of knees with Dr. Bello.  She has had some R TMJ popping and locking. She needs to continue to see her dentist for evaluation.  See plan below

## 2024-08-26 ENCOUNTER — TELEPHONE (OUTPATIENT)
Age: 64
End: 2024-08-26
Payer: MEDICARE

## 2024-08-26 NOTE — TELEPHONE ENCOUNTER
Pt would like the office note from her 7/8/24 visit sent to her.  She has MyChart, but I don't see note in chart.  -Avril Dee, CJA

## 2024-08-31 NOTE — TELEPHONE ENCOUNTER
Yes her note should be in there now.  I did her note during her visit but unfortunately secondary to some of the computer software I am using I have had some issues with signing it off correctly.  But it is taken care and in there for her.

## 2024-11-05 ENCOUNTER — TELEPHONE (OUTPATIENT)
Age: 64
End: 2024-11-05
Payer: MEDICARE

## 2024-11-05 NOTE — TELEPHONE ENCOUNTER
Caller: Mesha Najera    Relationship: Self    Best call back number: [phone]    What orders are you requesting (i.e. lab or imaging): INFUSION ORDERS: FIRST APPOINTMENT IS ON 11/21       Where will you receive your lab/imaging services: [facility]    Additional notes: PATIENT ORIGINAL APPT WITH DR. HUTTON HAS BEEN RESCHEDULED TO MARCH 2025. (PATIENT WAS OFFERED TO SCHEDULE WITH APRN BUT DECLINED DUE TO FEELING MORE COMFORTABLE WITH SEEING DR. HUTTON) PATIENT STATES SHE IS SCHEDULED FOR AN INFUSION AT [facility] ON 11/21/25 AND WAS WANTING TO MAKE SURE SHE CAN STILL HAVE THE ORDERS FOR THE INFUSION. PATIENT ALSO STATES SHE WILL BE OUT OF MEDICATION BEFORE MARCH AND IS WANTING TO MAKE SURE HER MEDICATIONS FROM DR. HUTTON CAN STILL BE REFILLED PRIOR TO HER FOLLOW-UP APPOINTMENT IN MARCH. PLEASE ADVISE AND CONTACT PATIENT WITH CONFIRMATION.

## 2024-11-05 NOTE — TELEPHONE ENCOUNTER
PT HAD TO BE CANCELLED DUE TO A PROVIDER EMERGENCY. I HAVE REACHED OUT VIA [employer], [employer] AND PHONE. IF PT CALLS BACK TO RESCHEDULE, PLEASE SCHEDULE WITH TURNER BROWN OR PARKER. IF THE PT WANTS TO SEE DR. HUTTON, SHE IS BOOKED UNTIL MARCH. IF IT IS A NEW PT, CONFRIM IF THEY ARE INTRESTED IN SEEING ANOTHER PROVIDER OR IF THEY WANT TO WAIT FOR DR. HUTTON. PT HAS BEEN ADDED TO THE CANCELLATION LIST AS HIGH PRIORITY.         -HUB READY TO SCHEDULE.

## 2024-11-05 NOTE — TELEPHONE ENCOUNTER
Pt does not need any refills at this time but will receive them when needed. I will check on the infusion orders.

## 2024-12-11 RX ORDER — COLCHICINE 0.6 MG/1
0.6 TABLET ORAL 2 TIMES DAILY
Qty: 60 TABLET | Refills: 0 | Status: SHIPPED | OUTPATIENT
Start: 2024-12-11

## 2025-01-07 RX ORDER — ME-TETRAHYDROFOLATE/B12/HRB236 1-1-500 MG
1 CAPSULE ORAL 2 TIMES DAILY
Qty: 180 CAPSULE | Refills: 1 | Status: SHIPPED | OUTPATIENT
Start: 2025-01-07

## 2025-01-07 RX ORDER — COLCHICINE 0.6 MG/1
0.6 TABLET ORAL 2 TIMES DAILY
Qty: 60 TABLET | Refills: 2 | Status: SHIPPED | OUTPATIENT
Start: 2025-01-07

## 2025-01-07 NOTE — TELEPHONE ENCOUNTER
Pt is requesting a refill for her Rheumate.  She has a return appt with [facility] in March 2025.  I sent a 90-day rx with 1 refill to [facility]. Pt informed via [employer]. -MARISELA Blanco

## 2025-01-07 NOTE — TELEPHONE ENCOUNTER
Pt is requesting, via [employer], a refill for her Colchicine. The last labs in chart are from 7/2024, but pt states that she had labs in Aug and Dec.  I called Dr. Pinto's office and spoke with Otilia.  She is going to fax labs.  Pt has RTC in March.  I will send in her Colchicine to [facility] per pt's request. -Avril Dee, CJA

## 2025-02-06 ENCOUNTER — TELEPHONE (OUTPATIENT)
Age: 65
End: 2025-02-06
Payer: MEDICARE

## 2025-02-06 NOTE — TELEPHONE ENCOUNTER
PT APPT HAD TO BE CANCELLED. IF PT CALLS BACK TO RESCHEDULE, PLEASE SCHEDULE WITH TURNER BROWN OR PARKER. PLEASE ADD PT TO THE CANCELLATION LIST AS NORMAL PRIOTIRY WITH AN END DATE OF 12/31/2025.   -HUB READY TO SCHEDULE.

## 2025-02-25 ENCOUNTER — TELEPHONE (OUTPATIENT)
Age: 65
End: 2025-02-25
Payer: MEDICARE

## 2025-02-25 NOTE — TELEPHONE ENCOUNTER
Pt sent message via [employer] wanting you to check on her getting her Rituxan infusion at [facility] in May. She said she had them in January. -MARISELA Blanco

## 2025-03-04 NOTE — TELEPHONE ENCOUNTER
Pt states she still hasn't heard from anyone and would like for someone to call her about getting her Rituxan infusion scheduled at [facility]. -Avril Dee, A

## 2025-03-07 ENCOUNTER — TELEPHONE (OUTPATIENT)
Age: 65
End: 2025-03-07

## 2025-03-07 NOTE — TELEPHONE ENCOUNTER
Patient is due for Rituxan in May. Can we please see if we can move her appointment up to April or May so I can send over updated infusion orders. Thank you!

## 2025-03-10 NOTE — TELEPHONE ENCOUNTER
PT ON WAITLIST, NO AVAILABLE SPOTS WITH [facility] [facility]. IF ANYTHING OPENS UP WE WILL WORK THE WAITLIST OR IF SHE WANTS TO CLOSER TO APRIL / MAY OR WEEK OF WE MIGHT BE ABLE TO GET HER IN.

## 2025-04-08 ENCOUNTER — TELEPHONE (OUTPATIENT)
Age: 65
End: 2025-04-08
Payer: MEDICARE

## 2025-04-08 NOTE — TELEPHONE ENCOUNTER
Pt sent message via [employer] requesting a refill for her Colchicine, Cyclobenzaprine and MTX.  She has a RTC on 6/11/25.  She also said she had labs on 3/17/25 at Dr. Pinto's office.  I called them @ [phone] and [facility] for Otilia in Med Rec to send us lab results.  I will pend and send rx to [facility] for approval to send in. -MARISELA Blanco

## 2025-04-09 NOTE — TELEPHONE ENCOUNTER
I called Dr. Pinto's office and spoke with Otilia in Med Rec.  She is faxing us labs from 3/17/2025.  She said pt had a CBC and CMP. -MARISELA Blanco

## 2025-04-10 RX ORDER — METHOTREXATE 25 MG/ML
10 INJECTION, SOLUTION INTRAMUSCULAR; INTRATHECAL; INTRAVENOUS; SUBCUTANEOUS WEEKLY
Qty: 4 ML | Refills: 2 | Status: SHIPPED | OUTPATIENT
Start: 2025-04-10

## 2025-04-10 RX ORDER — CYCLOBENZAPRINE HCL 10 MG
10 TABLET ORAL 3 TIMES DAILY
Qty: 90 TABLET | Refills: 2 | Status: SHIPPED | OUTPATIENT
Start: 2025-04-10

## 2025-04-10 RX ORDER — COLCHICINE 0.6 MG/1
0.6 TABLET ORAL 2 TIMES DAILY
Qty: 60 TABLET | Refills: 2 | Status: SHIPPED | OUTPATIENT
Start: 2025-04-10

## 2025-04-17 ENCOUNTER — TELEPHONE (OUTPATIENT)
Age: 65
End: 2025-04-17
Payer: MEDICARE

## 2025-04-17 NOTE — TELEPHONE ENCOUNTER
PA for Cyclobenazaprine approved. Faxed approval to [facility]. LITAM for pt. Letting her know as well.    SHAHID FOR HUB TO RELAY.

## 2025-04-18 ENCOUNTER — TELEPHONE (OUTPATIENT)
Age: 65
End: 2025-04-18
Payer: MEDICARE

## 2025-04-18 NOTE — TELEPHONE ENCOUNTER
This is a Dr. Hobbs patient that is scheduled with you next on 6/11/25. She currently receives IV Ruxience 1000mg day 1 and day 15 every 4 months at [facility]. Her last treatments were 1/3/25 & 1/17/25, making her due for her next infusion on 5/17/25. Would you like us to wait to send a new order until you are able to evaluate her? I can also have them try to get her in sooner with you or one of the nurse practitioners. Thank you so much!

## 2025-04-22 NOTE — ASSESSMENT & PLAN NOTE
Chronic muscle pain---no relief w/meds; POOR SLEEP.  Currently stable with intermittent sx.  Decreased gabapentin from Dr. Felder to bedtime.  FMS packet given and discussed - pt to discuss this with PCP.  Stable.-Symptoms wax and wane    Plan:  Continue flexeril.   Heat, ice, massage, Biofreeze.   Water aerobics, chair aerobics, exercising- YouTube. She is walking for exercise.  Orders:    Sedimentation Rate; Future    C-reactive Protein; Future      oriented to person, place, time and situation

## 2025-04-22 NOTE — PROGRESS NOTES
Office Follow Up      Date: 04/23/2025   Patient Name: Mesha Najera  MRN: [id number]  YOB: 1960    Referring Physician: No ref. provider found     Chief Complaint:   Chief Complaint   Patient presents with    Lupus     Follow up     Rheumatoid Arthritis     Follow up     Sjogrens Syndrome      Follow up     Fibromyalgia     Follow up        History of Present Illness:   Mesha Najera is a 65 y.o. female who is here today for follow up of Lupus, Rheumatoid Arthritis, Sjogrens Syndrome, Osteoporosis and Fibromyalgia    She is a former patient of Dr. Ana Hobbs and is awaiting an appointment with Dr. Jb Villareal. She has been doing well on Rituxan, Plaquenil, MTX, and Rheumate. She continues with Prolia and reports her last dose was 1/1/2025    She previously reported that Rituxan has significantly improved her Sjogren's syndrome, as evidenced by less dryness in her eyes, a symptom she has not experienced in over 20 years. Dr. Felder(Neurology) also suggested that Rituxan could assist in treamtment of MS, and MG.     She had Emergency Exploratory laparotomy with ileocecectomy with ileocolic anastomosis 11/2024 due to volvulus. Dr. Hoffman - tells her there is a high chance of reoccurrence.      Patient reports feeling the same today with a pain score of 4.5/10 and occasional joint swelling.  Today she needs a refill for her Rituximab as well as Colchicine.       Subjective     Review of Systems   Constitutional:  Positive for fatigue.   HENT:  Positive for dental problem (dry mouth and jaw pain) and mouth sores.    Eyes:  Positive for visual disturbance (dry eyes).   Respiratory:  Positive for chest tightness and shortness of breath.    Cardiovascular:  Positive for chest pain and leg swelling.   Gastrointestinal:  Positive for GERD.   Endocrine: Positive for heat intolerance.   Musculoskeletal:  Positive for arthralgias, back pain, joint swelling, myalgias and neck stiffness.  "  Skin:  Positive for color change, pallor (Raynauds), rash and bruise.   Neurological:  Positive for tremors, weakness, numbness and headache.   Hematological:  Bruises/bleeds easily.          Current Outpatient Medications:     aspirin 81 MG EC tablet, Take 1 tablet by mouth Daily., Disp: , Rfl:     colchicine 0.6 MG tablet, Take 1 tablet by mouth 2 (Two) Times a Day., Disp: 60 tablet, Rfl: 2    cyclobenzaprine (FLEXERIL) 10 MG tablet, Take 1 tablet by mouth 3 times a day., Disp: 90 tablet, Rfl: 2    Dietary Management Product (Rheumate) capsule, Take 1 capsule by mouth 2 (Two) Times a Day., Disp: 180 capsule, Rfl: 1    FT Arthritis Pain 1 % gel gel, apply two grams topically TO affected AREA FOUR TIMES DAILY, Disp: , Rfl:     gabapentin (NEURONTIN) 300 MG capsule, Take 1 capsule by mouth Daily., Disp: , Rfl:     hydroxychloroquine (PLAQUENIL) 200 MG tablet, Take 1 tablet by mouth Daily., Disp: 60 tablet, Rfl: 3    Insulin Syringe/Needle (B-D INS SYR MICROFINE 1CC/28G) 28G X 1/2\" 1 ML misc, Use 1 syringe 1 (One) Time Per Week. 1 syringe as directed for mtx injection, Disp: 10 each, Rfl: 3    Methotrexate Sodium 50 MG/2ML injection, Inject 0.4 mL under the skin into the appropriate area as directed 1 (One) Time Per Week., Disp: 4 mL, Rfl: 2    pantoprazole (PROTONIX) 40 MG EC tablet, Take 2 tablets by mouth Daily., Disp: , Rfl:     pyridostigmine (MESTINON) 60 MG tablet, Take 3 tablets by mouth Daily., Disp: , Rfl:     RITUXIMAB IV, Infuse  into a venous catheter Every 4 (Four) Months. 2 infusions q 4 months, infusions are 2 weeks apart, Disp: , Rfl:     riTUXimab-pvvr (Ruxience) 100 MG/10ML solution injection, Infuse  into a venous catheter See Admin Instructions. Administer RUXIENCE 1000 every 4 months, Disp: , Rfl:     temazepam (RESTORIL) 15 MG capsule, Take 1 capsule by mouth At Night As Needed for Sleep., Disp: , Rfl:     atorvastatin (LIPITOR) 20 MG tablet, Take 1 tablet by mouth Daily., Disp: , Rfl: " "    Allergies   Allergen Reactions    Milnacipran Hcl [Milnacipran] Unknown - High Severity    Penicillins Swelling    Arava [Leflunomide] Rash    Cellcept [Mycophenolate] Rash    Dienogest Rash     SIBILLA    Pregabalin Rash    Sulfa Antibiotics Rash       I have reviewed and updated the patient's chief complaint, history of present illness, review of systems, past medical history, surgical history, family history, social history, medications and allergy list as appropriate.     Objective      Vitals:    04/23/25 1255 04/23/25 1412   BP: 108/68    BP Location: Left arm    Pulse: 67    Temp: 97.3 °F (36.3 °C)    Weight: 51.3 kg (113 lb 3.2 oz)    Height: 172.7 cm (68\")    PainSc:  4      Body mass index is 17.21 kg/m².        Physical Exam  Constitutional:       Appearance: Normal appearance.   HENT:      Head: Normocephalic.   Eyes:      Pupils: Pupils are equal, round, and reactive to light.   Cardiovascular:      Rate and Rhythm: Normal rate.      Heart sounds: Normal heart sounds.   Pulmonary:      Effort: Pulmonary effort is normal.      Breath sounds: Normal breath sounds.   Abdominal:      General: Abdomen is flat.   Musculoskeletal:      Comments: Bilateral Erythema to scattered MCP's   Bilateral Knee Crepitus   Heberdens scattered   Antalgic gait   Squaring of Bilateral CMC's  Diffuse Allodynia      Neurological:      Mental Status: She is alert and oriented to person, place, and time.   Psychiatric:         Mood and Affect: Mood normal.         Behavior: Behavior normal.         Judgment: Judgment normal.        Procedures    Assessment / Plan      Assessment & Plan  Systemic lupus erythematosus, unspecified SLE type, unspecified organ involvement status  +JOSUE 1:320, + SSA/SSB, RF=31; pleurisy, oral sores(increased w/mtx), Sjogren's, +JOSUE, leucopenia/anemia w/normal bm bx. Raynaud's, inflammatory arthritis. have had steroids, plaquenil, mtx, Arava(rash/fever), imuran (rash fever) CellCept (rash/fever) " Rituxan x 1--no relief, Took Benlysta x 1 year without relief.;  ;sx better on mtx/plaquenil with improvement in jt pain/stiffness but still has pleurisy--in past took decadron injxn w/pcp - Last reported dose Feb 2025  On Rheumate Bid - mouth sores currently not a problem.  + symptoms, see HPI.  Joint pain overall has improve on Rituxan- unchanged  Positive photosensitivity, sicca sx, oral ulcers, pleurisy 7/23, Raynauds. Her elbows, hands, feet and ankles swell and are painful at times    Plan:  Continue Plaquenil.  Continue Mtx and Rituxan.  Continue Rheumate for oral ulcers.  She has had mild leukopenia. We trialed her off Plaquenil without change. We decreased mtx without change. Probably lupus related, and she saw oncology-see plan below  Orders:    Urinalysis With Culture If Indicated -; Future    Sedimentation Rate; Future    Protein / Creatinine Ratio, Urine - Urine, Clean Catch; Future    C-reactive Protein; Future    Comprehensive Metabolic Panel; Future    CBC Auto Differential; Future    C4+C3; Future     Rheumatoid arthritis of multiple sites with negative rheumatoid factor  Intermittent joint pain and swelling.; +RF  S/p Plaquenil, mtx, Arava, imuran, CellCept, Benlysta, now on Rituxan.   Previous XR of the knees showed chondrocalcinosis (2021) -  Currently on Colchicine   Previous XR of the pelvic showed DJD of the R hip and SI joints.  Knee pain  - multifactorial- Oa, CPPD, SLE/RA;   Continues to have pain and stiffness. Denies swelling.  + am stiffness.  Colchicine helps her knee which is consistent with CPPD / Chondrocalcinosis. She reportedly also needs knee replacements, getting gel and steroid injections to avoid replacement at this time.   MCP swelling resolved with plaquenil/ Rituxan.  Hand film in June 2022- Osteoarthritis of multiple joints.  Avoid TNFs due to lupus. Avoid Orencia - chest xr consistent with COPD.   Stable.    Plan:  Continue Plaquenil  Continue methotrexate.   Continue  Rituxan.   Continues on colchicine for CPPD  She is rotating gel and steroid injections for OA of knees with Dr. Bello.  She has had some R TMJ popping and locking (botox injections with dentist). She needs to continue to see her dentist for evaluation.  See plan below  Orders:    Sedimentation Rate; Future    C-reactive Protein; Future    Comprehensive Metabolic Panel; Future     Sjogren's syndrome, with unspecified organ involvement  Stable---using eye drops and could not get Numoisyn; not much relief w/salagen/evoxac  Biotene gum ;using Xylimelts! Has severe eye dryness- Has not tolerated Restasis etc.  Stable    Plan:  See plan below regarding rituximab  Continue oasis spray, Xylimelts, Biotene as they seem to help with sx.  Orders:    Urinalysis With Culture If Indicated -; Future    Sedimentation Rate; Future    Protein / Creatinine Ratio, Urine - Urine, Clean Catch; Future    C-reactive Protein; Future    Comprehensive Metabolic Panel; Future    CBC Auto Differential; Future     High risk medication use  Rituxan and mtx - well tolerated and effective.   10/21 Negative Hepatitis.   12/22 Qfn neg.  CXR 12/21- Emphysema.  Hx. Neutropenia-stabilized see below.       Plan:  Eye Check Q 12 mos with OCT for Plaquenil- 4/2025  Cbc,Cmp Q 2 months -   Update QTB and Hepatitis panel with next labs     Would hold methotrexate/Rituxan for any infection or illness, Seek treatment and when well and illness is resolved you may restart medication.   Orders:    C-reactive Protein; Future    Comprehensive Metabolic Panel; Future    CBC Auto Differential; Future    Hepatitis Panel, Acute; Future     Long term (current) use of systemic steroids  Patient receives IM steroids Q 2-4 months for years.  She has occasional taken rounds of IV steroids.   Her last round of steroids were in October 2019.  She is trying not to take steroids but does not feel well; Aches all over.  Positive fatigue.  In the past she has not tolerated oral  steroids. I do have some concern that she could have secondary adrenal insufficiency.  Chronic steroid can delay healing of fractures or wounds; Can increase blood count and make her more predisposed to thrush/yeast infections.  AM Cortisol in 12/19  Normal.    Plan:  Has steroids planned 4/28/2025 with [facility].   Orders:    Comprehensive Metabolic Panel; Future    CBC Auto Differential; Future     Fibromyalgia  Chronic muscle pain---no relief w/meds; POOR SLEEP.  Currently stable with intermittent sx.  Decreased gabapentin from Dr. Felder to bedtime.  FMS packet given and discussed - pt to discuss this with PCP.  Stable.-Symptoms wax and wane    Plan:  Continue flexeril.   Heat, ice, massage, Biofreeze.   Water aerobics, chair aerobics, exercising- YouTube. She is walking for exercise.  Orders:    Sedimentation Rate; Future    C-reactive Protein; Future     Osteopetrosis  Stress fracture L foot 2/19.  Stress Fracture Sacrum 3/2024  Started  Prolia with pcp June 2019.   She was off Prolia for 1 year, she restarted this and suffered some compression fractures. Prolia should be indefinite because if she d/c this DEXA will decrease.   DEXA 10/23 T score -2.9 lumbar spine, -3.3 L femoral neck. Consistent with severe osteoporosis of the femoral neck and osteoporosis of the spine. No comparisons or statistical analysis from 2021. ? worsening of the femoral neck  Reported Jaw pain, dentist aware - following for TMJ, Getting Botox injections for this.   Last injection Jan 2025    Plan:  Continue Prolia. She was noted to have 2 sacral fractures 12/23.  Calcium discontinued due to hypercalcemia   Weight bearing exercise- Patient walking.  Dexa due 10/2025 with pcp  Previously seen at [facility].   Next dose due 7/2025        Raynaud's disease without gangrene  Intermittent.-Flares with cold weather.  She has not tolerated medications for this in the past.    Plan:  Keep Extremities warm with socks and  Gloves.  Consider electric socks and gloves.  Hand warmers         Myasthenia gravis  Follows with Dr. Felder-  Neurology   Hx numbness going up bilateral legs into the groin with low back pain. Reported hx of weakness in the hands and cannot . She is taking gabapentin 300 mg QHS routinely but has a RX for 300mg TID.   S/p Arava - did not tolerate with rash and fever.   Currently on mtx and Rituxan.     Plan:  Hand weakness and persistent numbness. Electric shocks per pt.   Continue Rituxan.  Dr. Felder would like her to continue medication.        Chronic right-sided low back pain without sciatica  Lumbar spine MRI 6/2021- Mild R sided neuroforaminal narrowing at L3-4 due to bulging disc. She does have some DDD of L3- S1..        Other fatigue  Multifactorial   Update QTB and Hepatitis Panel with next labs.   Orders:    Hepatitis Panel, Acute; Future    Multiple sclerosis  Confirmed Dr. Felder.  He  wanted us to consider med that would treat both her lupus as well as her MS.  S/p Arava - did not tolerate with rash and fever.   Currently on mtx and Rituxan.   Pt does not think MS has changed at this point.     Plan:  Hand weakness and persistent numbness. Electric shocks per pt.   Continue Rituxan.  Dr. Felder would like her to continue medication.        Primary osteoarthritis of knees, bilateral  Followed by [facility] - Dr. Bello  Bilateral hylan (Synvisc) injections 1/22/25  Has had Bilateral steroid injections previously.   Has an appointment for steroid injections 4/28/25       Neutropenia, unspecified type  Hx of.   Followed by Dr. Maine Vega  Hematology   Dr. Vega believes Neutropenia is multifactorial and had recommended discontinuation of lamisil, after which her CBC stabilized, as well B12/folate, C-ANCA and P-ANCA. Dr. Grove discussed possible bone marrow biopsy in future if deemed necessary.   Stable at this time. Labs 3/17/25 WBC normalized.   Orders:    CBC Auto Differential;  Future      TIME SPENT: I spent 40 minutes caring for the patient on this date of service.  This time includes time spent by me in the following activities: Preparing for the visit, obtaining records, reviewing/ordering tests and independently reviewing results, performing a medically appropriate history/exam, counseling and educating the patient/family/caregiver, ordering medications, tests, or procedures, and documenting information in the medical record.       Follow Up:   Return for Next scheduled follow up.      MONIK Wilkins  [facility]

## 2025-04-22 NOTE — ASSESSMENT & PLAN NOTE
Stable---using eye drops and could not get Numoisyn; not much relief w/salagen/evoxac  Biotene gum ;using Xylimelts! Has severe eye dryness- Has not tolerated Restasis etc.  Stable    Plan:  See plan below regarding rituximab  Continue oasis spray, Xylimelts, Biotene as they seem to help with sx.  Orders:    Urinalysis With Culture If Indicated -; Future    Sedimentation Rate; Future    Protein / Creatinine Ratio, Urine - Urine, Clean Catch; Future    C-reactive Protein; Future    Comprehensive Metabolic Panel; Future    CBC Auto Differential; Future

## 2025-04-22 NOTE — ASSESSMENT & PLAN NOTE
Rituxan and mtx - well tolerated and effective.   10/21 Negative Hepatitis.   12/22 Qfn neg.  CXR 12/21- Emphysema.  Hx. Neutropenia-stabilized see below.       Plan:  Eye Check Q 12 mos with OCT for Plaquenil- 4/2025  Cbc,Cmp Q 2 months -   Update QTB and Hepatitis panel with next labs     Would hold methotrexate/Rituxan for any infection or illness, Seek treatment and when well and illness is resolved you may restart medication.   Orders:    C-reactive Protein; Future    Comprehensive Metabolic Panel; Future    CBC Auto Differential; Future    Hepatitis Panel, Acute; Future

## 2025-04-22 NOTE — ASSESSMENT & PLAN NOTE
Confirmed Dr. Felder.  He  wanted us to consider med that would treat both her lupus as well as her MS.  S/p Arava - did not tolerate with rash and fever.   Currently on mtx and Rituxan.   Pt does not think MS has changed at this point.     Plan:  Hand weakness and persistent numbness. Electric shocks per pt.   Continue Rituxan.  Dr. Felder would like her to continue medication.

## 2025-04-22 NOTE — ASSESSMENT & PLAN NOTE
Intermittent.-Flares with cold weather.  She has not tolerated medications for this in the past.    Plan:  Keep Extremities warm with socks and Gloves.  Consider electric socks and gloves.  Hand warmers

## 2025-04-22 NOTE — ASSESSMENT & PLAN NOTE
Intermittent joint pain and swelling.; +RF  S/p Plaquenil, mtx, Arava, imuran, CellCept, Benlysta, now on Rituxan.   Previous XR of the knees showed chondrocalcinosis (2021) -  Currently on Colchicine   Previous XR of the pelvic showed DJD of the R hip and SI joints.  Knee pain  - multifactorial- Oa, CPPD, SLE/RA;   Continues to have pain and stiffness. Denies swelling.  + am stiffness.  Colchicine helps her knee which is consistent with CPPD / Chondrocalcinosis. She reportedly also needs knee replacements, getting gel and steroid injections to avoid replacement at this time.   MCP swelling resolved with plaquenil/ Rituxan.  Hand film in June 2022- Osteoarthritis of multiple joints.  Avoid TNFs due to lupus. Avoid Orencia - chest xr consistent with COPD.   Stable.    Plan:  Continue Plaquenil  Continue methotrexate.   Continue Rituxan.   Continues on colchicine for CPPD  She is rotating gel and steroid injections for OA of knees with Dr. Bello.  She has had some R TMJ popping and locking (botox injections with dentist). She needs to continue to see her dentist for evaluation.  See plan below  Orders:    Sedimentation Rate; Future    C-reactive Protein; Future    Comprehensive Metabolic Panel; Future

## 2025-04-22 NOTE — ASSESSMENT & PLAN NOTE
+JOSUE 1:320, + SSA/SSB, RF=31; pleurisy, oral sores(increased w/mtx), Sjogren's, +JOSUE, leucopenia/anemia w/normal bm bx. Raynaud's, inflammatory arthritis. have had steroids, plaquenil, mtx, Arava(rash/fever), imuran (rash fever) CellCept (rash/fever) Rituxan x 1--no relief, Took Benlysta x 1 year without relief.;  ;sx better on mtx/plaquenil with improvement in jt pain/stiffness but still has pleurisy--in past took decadron injxn w/pcp - Last reported dose Feb 2025  On Rheumate Bid - mouth sores currently not a problem.  + symptoms, see HPI.  Joint pain overall has improve on Rituxan- unchanged  Positive photosensitivity, sicca sx, oral ulcers, pleurisy 7/23, Raynauds. Her elbows, hands, feet and ankles swell and are painful at times    Plan:  Continue Plaquenil.  Continue Mtx and Rituxan.  Continue Rheumate for oral ulcers.  She has had mild leukopenia. We trialed her off Plaquenil without change. We decreased mtx without change. Probably lupus related, and she saw oncology-see plan below  Orders:    Urinalysis With Culture If Indicated -; Future    Sedimentation Rate; Future    Protein / Creatinine Ratio, Urine - Urine, Clean Catch; Future    C-reactive Protein; Future    Comprehensive Metabolic Panel; Future    CBC Auto Differential; Future    C4+C3; Future

## 2025-04-22 NOTE — ASSESSMENT & PLAN NOTE
Patient receives IM steroids Q 2-4 months for years.  She has occasional taken rounds of IV steroids.   Her last round of steroids were in October 2019.  She is trying not to take steroids but does not feel well; Aches all over.  Positive fatigue.  In the past she has not tolerated oral steroids. I do have some concern that she could have secondary adrenal insufficiency.  Chronic steroid can delay healing of fractures or wounds; Can increase blood count and make her more predisposed to thrush/yeast infections.  AM Cortisol in 12/19  Normal.    Plan:  Has steroids planned 4/28/2025 with [facility].   Orders:    Comprehensive Metabolic Panel; Future    CBC Auto Differential; Future

## 2025-04-22 NOTE — ASSESSMENT & PLAN NOTE
Lumbar spine MRI 6/2021- Mild R sided neuroforaminal narrowing at L3-4 due to bulging disc. She does have some DDD of L3- S1..

## 2025-04-22 NOTE — ASSESSMENT & PLAN NOTE
Stress fracture L foot 2/19.  Stress Fracture Sacrum 3/2024  Started  Prolia with pcp June 2019.   She was off Prolia for 1 year, she restarted this and suffered some compression fractures. Prolia should be indefinite because if she d/c this DEXA will decrease.   DEXA 10/23 T score -2.9 lumbar spine, -3.3 L femoral neck. Consistent with severe osteoporosis of the femoral neck and osteoporosis of the spine. No comparisons or statistical analysis from 2021. ? worsening of the femoral neck  Reported Jaw pain, dentist aware - following for TMJ, Getting Botox injections for this.   Last injection Jan 2025    Plan:  Continue Prolia. She was noted to have 2 sacral fractures 12/23.  Calcium discontinued due to hypercalcemia   Weight bearing exercise- Patient walking.  Dexa due 10/2025 with pcp  Previously seen at [facility].   Next dose due 7/2025

## 2025-04-23 ENCOUNTER — OFFICE VISIT (OUTPATIENT)
Age: 65
End: 2025-04-23
Payer: MEDICARE

## 2025-04-23 ENCOUNTER — TELEPHONE (OUTPATIENT)
Age: 65
End: 2025-04-23

## 2025-04-23 ENCOUNTER — LAB (OUTPATIENT)
Facility: HOSPITAL | Age: 65
End: 2025-04-23
Payer: MEDICARE

## 2025-04-23 VITALS
SYSTOLIC BLOOD PRESSURE: 108 MMHG | HEIGHT: 68 IN | WEIGHT: 113.2 LBS | HEART RATE: 67 BPM | BODY MASS INDEX: 17.16 KG/M2 | TEMPERATURE: 97.3 F | DIASTOLIC BLOOD PRESSURE: 68 MMHG

## 2025-04-23 DIAGNOSIS — Z79.899 HIGH RISK MEDICATION USE: Chronic | ICD-10-CM

## 2025-04-23 DIAGNOSIS — M35.00 SJOGREN'S SYNDROME, WITH UNSPECIFIED ORGAN INVOLVEMENT: Chronic | ICD-10-CM

## 2025-04-23 DIAGNOSIS — M06.09 RHEUMATOID ARTHRITIS OF MULTIPLE SITES WITH NEGATIVE RHEUMATOID FACTOR: Chronic | ICD-10-CM

## 2025-04-23 DIAGNOSIS — R53.83 OTHER FATIGUE: ICD-10-CM

## 2025-04-23 DIAGNOSIS — Z79.52 LONG TERM (CURRENT) USE OF SYSTEMIC STEROIDS: Chronic | ICD-10-CM

## 2025-04-23 DIAGNOSIS — M32.9 SYSTEMIC LUPUS ERYTHEMATOSUS, UNSPECIFIED SLE TYPE, UNSPECIFIED ORGAN INVOLVEMENT STATUS: Primary | Chronic | ICD-10-CM

## 2025-04-23 DIAGNOSIS — M79.7 FIBROMYALGIA: Chronic | ICD-10-CM

## 2025-04-23 DIAGNOSIS — M54.50 CHRONIC RIGHT-SIDED LOW BACK PAIN WITHOUT SCIATICA: ICD-10-CM

## 2025-04-23 DIAGNOSIS — Q78.2 OSTEOPETROSIS: Chronic | ICD-10-CM

## 2025-04-23 DIAGNOSIS — I73.00 RAYNAUD'S DISEASE WITHOUT GANGRENE: ICD-10-CM

## 2025-04-23 DIAGNOSIS — G89.29 CHRONIC RIGHT-SIDED LOW BACK PAIN WITHOUT SCIATICA: ICD-10-CM

## 2025-04-23 DIAGNOSIS — Z79.899 POLYPHARMACY: Primary | ICD-10-CM

## 2025-04-23 DIAGNOSIS — M32.9 SYSTEMIC LUPUS ERYTHEMATOSUS, UNSPECIFIED SLE TYPE, UNSPECIFIED ORGAN INVOLVEMENT STATUS: ICD-10-CM

## 2025-04-23 DIAGNOSIS — G35 MULTIPLE SCLEROSIS: Chronic | ICD-10-CM

## 2025-04-23 DIAGNOSIS — G70.00 MYASTHENIA GRAVIS: Chronic | ICD-10-CM

## 2025-04-23 DIAGNOSIS — D70.9 NEUTROPENIA, UNSPECIFIED TYPE: ICD-10-CM

## 2025-04-23 DIAGNOSIS — M17.0 PRIMARY OSTEOARTHRITIS OF KNEES, BILATERAL: Chronic | ICD-10-CM

## 2025-04-23 PROBLEM — S22.000A THORACIC COMPRESSION FRACTURE: Status: ACTIVE | Noted: 2025-04-23

## 2025-04-23 PROBLEM — M48.48XA STRESS FRACTURE OF SACRUM: Status: ACTIVE | Noted: 2024-03-21

## 2025-04-23 PROBLEM — K21.9 GERD (GASTROESOPHAGEAL REFLUX DISEASE): Status: ACTIVE | Noted: 2025-04-23

## 2025-04-23 PROBLEM — M79.2 NEUROPATHIC PAIN: Status: ACTIVE | Noted: 2024-02-19

## 2025-04-23 LAB
ALBUMIN SERPL-MCNC: 4.5 G/DL (ref 3.5–5.2)
ALBUMIN/GLOB SERPL: 1.9 G/DL
ALP SERPL-CCNC: 111 U/L (ref 39–117)
ALT SERPL W P-5'-P-CCNC: 23 U/L (ref 1–33)
ANION GAP SERPL CALCULATED.3IONS-SCNC: 10.8 MMOL/L (ref 5–15)
AST SERPL-CCNC: 42 U/L (ref 1–32)
BACTERIA UR QL AUTO: ABNORMAL /HPF
BASOPHILS # BLD AUTO: 0.05 10*3/MM3 (ref 0–0.2)
BASOPHILS NFR BLD AUTO: 0.8 % (ref 0–1.5)
BILIRUB SERPL-MCNC: 0.6 MG/DL (ref 0–1.2)
BILIRUB UR QL STRIP: NEGATIVE
BUN SERPL-MCNC: 7 MG/DL (ref 8–23)
BUN/CREAT SERPL: 8.8 (ref 7–25)
C3 SERPL-MCNC: 122 MG/DL (ref 82–167)
C4 SERPL-MCNC: 14 MG/DL (ref 14–44)
CALCIUM SPEC-SCNC: 9.3 MG/DL (ref 8.6–10.5)
CHLORIDE SERPL-SCNC: 106 MMOL/L (ref 98–107)
CLARITY UR: CLEAR
CO2 SERPL-SCNC: 22.2 MMOL/L (ref 22–29)
COLOR UR: YELLOW
CREAT SERPL-MCNC: 0.8 MG/DL (ref 0.57–1)
CRP SERPL-MCNC: <0.3 MG/DL (ref 0–0.5)
DEPRECATED RDW RBC AUTO: 47.3 FL (ref 37–54)
EGFRCR SERPLBLD CKD-EPI 2021: 81.9 ML/MIN/1.73
EOSINOPHIL # BLD AUTO: 0.51 10*3/MM3 (ref 0–0.4)
EOSINOPHIL NFR BLD AUTO: 8.6 % (ref 0.3–6.2)
ERYTHROCYTE [DISTWIDTH] IN BLOOD BY AUTOMATED COUNT: 15.5 % (ref 12.3–15.4)
ERYTHROCYTE [SEDIMENTATION RATE] IN BLOOD: 6 MM/HR (ref 0–30)
GLOBULIN UR ELPH-MCNC: 2.4 GM/DL
GLUCOSE SERPL-MCNC: 63 MG/DL (ref 65–99)
GLUCOSE UR STRIP-MCNC: NEGATIVE MG/DL
HAV IGM SERPL QL IA: NORMAL
HBV CORE IGM SERPL QL IA: NORMAL
HBV SURFACE AG SERPL QL IA: NORMAL
HCT VFR BLD AUTO: 34.4 % (ref 34–46.6)
HCV AB SER QL: NORMAL
HGB BLD-MCNC: 10.8 G/DL (ref 12–15.9)
HGB UR QL STRIP.AUTO: NEGATIVE
HOLD SPECIMEN: NORMAL
HYALINE CASTS UR QL AUTO: ABNORMAL /LPF
IMM GRANULOCYTES # BLD AUTO: 0.01 10*3/MM3 (ref 0–0.05)
IMM GRANULOCYTES NFR BLD AUTO: 0.2 % (ref 0–0.5)
KETONES UR QL STRIP: NEGATIVE
LEUKOCYTE ESTERASE UR QL STRIP.AUTO: ABNORMAL
LYMPHOCYTES # BLD AUTO: 1.22 10*3/MM3 (ref 0.7–3.1)
LYMPHOCYTES NFR BLD AUTO: 20.5 % (ref 19.6–45.3)
MCH RBC QN AUTO: 26.7 PG (ref 26.6–33)
MCHC RBC AUTO-ENTMCNC: 31.4 G/DL (ref 31.5–35.7)
MCV RBC AUTO: 84.9 FL (ref 79–97)
MONOCYTES # BLD AUTO: 0.6 10*3/MM3 (ref 0.1–0.9)
MONOCYTES NFR BLD AUTO: 10.1 % (ref 5–12)
NEUTROPHILS NFR BLD AUTO: 3.55 10*3/MM3 (ref 1.7–7)
NEUTROPHILS NFR BLD AUTO: 59.8 % (ref 42.7–76)
NITRITE UR QL STRIP: NEGATIVE
NRBC BLD AUTO-RTO: 0 /100 WBC (ref 0–0.2)
PH UR STRIP.AUTO: 6 [PH] (ref 5–8)
PLATELET # BLD AUTO: 301 10*3/MM3 (ref 140–450)
PMV BLD AUTO: 10.6 FL (ref 6–12)
POTASSIUM SERPL-SCNC: 4 MMOL/L (ref 3.5–5.2)
PROT SERPL-MCNC: 6.9 G/DL (ref 6–8.5)
PROT UR QL STRIP: ABNORMAL
RBC # BLD AUTO: 4.05 10*6/MM3 (ref 3.77–5.28)
RBC # UR STRIP: ABNORMAL /HPF
REF LAB TEST METHOD: ABNORMAL
SODIUM SERPL-SCNC: 139 MMOL/L (ref 136–145)
SP GR UR STRIP: 1.02 (ref 1–1.03)
SQUAMOUS #/AREA URNS HPF: ABNORMAL /HPF
UROBILINOGEN UR QL STRIP: ABNORMAL
WBC # UR STRIP: ABNORMAL /HPF
WBC NRBC COR # BLD AUTO: 5.94 10*3/MM3 (ref 3.4–10.8)

## 2025-04-23 PROCEDURE — 86160 COMPLEMENT ANTIGEN: CPT

## 2025-04-23 PROCEDURE — 82570 ASSAY OF URINE CREATININE: CPT

## 2025-04-23 PROCEDURE — 85025 COMPLETE CBC W/AUTO DIFF WBC: CPT

## 2025-04-23 PROCEDURE — 80053 COMPREHEN METABOLIC PANEL: CPT

## 2025-04-23 PROCEDURE — 85652 RBC SED RATE AUTOMATED: CPT

## 2025-04-23 PROCEDURE — 80074 ACUTE HEPATITIS PANEL: CPT

## 2025-04-23 PROCEDURE — 81001 URINALYSIS AUTO W/SCOPE: CPT

## 2025-04-23 PROCEDURE — 36415 COLL VENOUS BLD VENIPUNCTURE: CPT

## 2025-04-23 PROCEDURE — 84156 ASSAY OF PROTEIN URINE: CPT

## 2025-04-23 PROCEDURE — 86480 TB TEST CELL IMMUN MEASURE: CPT

## 2025-04-23 PROCEDURE — 86140 C-REACTIVE PROTEIN: CPT

## 2025-04-23 RX ORDER — COLCHICINE 0.6 MG/1
0.6 TABLET ORAL 2 TIMES DAILY
Qty: 60 TABLET | Refills: 2 | Status: SHIPPED | OUTPATIENT
Start: 2025-04-23

## 2025-04-23 RX ORDER — ATORVASTATIN CALCIUM 20 MG/1
20 TABLET, FILM COATED ORAL DAILY
COMMUNITY

## 2025-04-23 NOTE — ASSESSMENT & PLAN NOTE
Follows with Dr. Felder-  Neurology   Hx numbness going up bilateral legs into the groin with low back pain. Reported hx of weakness in the hands and cannot . She is taking gabapentin 300 mg QHS routinely but has a RX for 300mg TID.   S/p Arava - did not tolerate with rash and fever.   Currently on mtx and Rituxan.     Plan:  Hand weakness and persistent numbness. Electric shocks per pt.   Continue Rituxan.  Dr. Felder would like her to continue medication.

## 2025-04-23 NOTE — ASSESSMENT & PLAN NOTE
Followed by [facility] - Dr. Bello  Bilateral hylan (Synvisc) injections 1/22/25  Has had Bilateral steroid injections previously.   Has an appointment for steroid injections 4/28/25

## 2025-04-24 ENCOUNTER — TELEPHONE (OUTPATIENT)
Age: 65
End: 2025-04-24
Payer: MEDICARE

## 2025-04-24 LAB
CREAT UR-MCNC: 116.9 MG/DL
PROT ?TM UR-MCNC: 9.9 MG/DL
PROT/CREAT UR: 84.7 MG/G CREA (ref 0–200)

## 2025-04-24 NOTE — TELEPHONE ENCOUNTER
Pt sent message via [facility] requesting that all of her lab results, including urinalysis, from yesterday's appt with Jaqui, be mailed to her.  She would like to share them with her PCP in [place].  She has MyChart, but doesn't have access to printer at home to print them out. -Avril Dee, RMA

## 2025-04-26 LAB
GAMMA INTERFERON BACKGROUND BLD IA-ACNC: 0.04 IU/ML
M TB IFN-G BLD-IMP: NEGATIVE
M TB IFN-G CD4+ BCKGRND COR BLD-ACNC: 0.05 IU/ML
M TB IFN-G CD4+CD8+ BCKGRND COR BLD-ACNC: 0.05 IU/ML
MITOGEN IGNF BCKGRD COR BLD-ACNC: >10 IU/ML
QUANTIFERON INCUBATION: NORMAL
SERVICE CMNT-IMP: NORMAL

## 2025-06-10 PROBLEM — M81.8 OTHER OSTEOPOROSIS WITHOUT CURRENT PATHOLOGICAL FRACTURE: Status: ACTIVE | Noted: 2025-06-10

## 2025-06-10 NOTE — ASSESSMENT & PLAN NOTE
Stress fracture L foot 2/19.  Stress Fracture Sacrum 3/2024  Started  Prolia with pcp June 2019.   She was off Prolia for 1 year, she restarted this and suffered some compression fractures. Prolia should be indefinite because if she d/c this DEXA will decrease.   DEXA 10/23 T score -2.9 lumbar spine, -3.3 L femoral neck. Consistent with severe osteoporosis of the femoral neck and osteoporosis of the spine. No comparisons or statistical analysis from 2021. ? worsening of the femoral neck  Reported Jaw pain, dentist aware - following for TMJ, Getting Botox injections for this.   Last injection Jan 2025    She gets Prolia through her primary care provider.  Continue Prolia. She was noted to have 2 sacral fractures 12/23.  Calcium discontinued due to hypercalcemia   Weight bearing exercise- Patient walking.  Dexa due 10/2025 with pcp  Previously seen at [facility].   Next dose due 7/2025

## 2025-06-10 NOTE — ASSESSMENT & PLAN NOTE
Rituxan and mtx - well tolerated and effective.  Hydroxychloroquine.  10/21 Negative Hepatitis.   12/22 Qfn neg.  CXR 12/21- Emphysema.  Hx. Neutropenia-stabilized see below.       Eye Check Q 12 mos with OCT for Plaquenil- 4/2025  Cbc,Cmp Q 2 months -   Would hold methotrexate/Rituxan for any infection or illness, Seek treatment and when well and illness is resolved you may restart medication.

## 2025-06-10 NOTE — ASSESSMENT & PLAN NOTE
Intermittent joint pain and swelling.; +RF  S/p Plaquenil, mtx, Arava, imuran, CellCept, Benlysta, now on Rituxan.   Previous XR of the knees showed chondrocalcinosis (2021) -  Currently on Colchicine   Previous XR of the pelvic showed DJD of the R hip and SI joints.  Knee pain  - multifactorial- Oa, CPPD, SLE/RA;   Colchicine helps her knee which is consistent with CPPD / Chondrocalcinosis. She reportedly also needs knee replacements, getting gel and steroid injections to avoid replacement at this time.   MCP swelling resolved with plaquenil/ Rituxan.  Hand film in June 2022- Osteoarthritis of multiple joints.  Avoid TNFs due to lupus. Avoid Orencia - chest xr consistent with COPD.   Swollen joint count is: 0, Tender joint count is: 0, Physician global is: 0, VAS is: 3, Patient global is: 3  CDAI is 3 - Low Disease Activity .  Continue Plaquenil  Continue methotrexate.   Continue Rituxan.   Continues on colchicine for CPPD  She is rotating gel and steroid injections for OA of knees with Dr. Bello.  She has had some R TMJ popping and locking (botox injections with dentist). She needs to continue to see her dentist for evaluation.  Standing lab order given.

## 2025-06-10 NOTE — ASSESSMENT & PLAN NOTE
Intermittent.-Flares with cold weather.  She has not tolerated medications for this in the past.    Keep Extremities warm with socks and Gloves.  Consider electric socks and gloves.  Hand warmers

## 2025-06-10 NOTE — ASSESSMENT & PLAN NOTE
Stable---using eye drops and could not get Numoisyn; not much relief w/salagen/evoxac  Biotene gum ;using Xylimelts! Has severe eye dryness- Has not tolerated Restasis etc.    She appears stable according to her history.  Continue rituximab.  She feels it helps.  Continue oasis spray, Xylimelts, Biotene as they seem to help with sx.

## 2025-06-10 NOTE — ASSESSMENT & PLAN NOTE
Followed by  [facility] - Dr. Bello  Bilateral hylan (Synvisc) injections 1/22/25  Has had Bilateral steroid injections previously.   Has an appointment for synvisc injections 7/25

## 2025-06-10 NOTE — ASSESSMENT & PLAN NOTE
+JOSUE 1:320, + SSA/SSB, RF=31; pleurisy, oral sores(increased w/mtx), Sjogren's, +JOSUE, leucopenia/anemia w/normal bm bx. Raynaud's, inflammatory arthritis. have had steroids, plaquenil, mtx, Arava(rash/fever), imuran (rash fever) CellCept (rash/fever)    Took Benlysta x 1 year without relief.;  ;  sx better on mtx/plaquenil with improvement in jt pain/stiffness but still has pleurisy--in past took decadron injxn w/pcp - Last reported dose Feb 2025  On Rheumate Bid     Joint pain overall has improved on Rituxan.  She has no active rash, alopecia, joint swelling, fevers, or oral ulcers.  Patient global is 3/10.  Continue Plaquenil.  Continue Mtx and Rituxan.  Continue Rheumate for oral ulcers.  She has had mild leukopenia. We stopped Plaquenil without change. We decreased mtx without change. Probably lupus related, and she saw oncology-see plan below    I reviewed her labs from last month and they were acceptable.  I will see her in follow-up in 3 months.    Orders:    CBC Auto Differential; Standing    Comprehensive Metabolic Panel; Standing    C-reactive Protein; Standing    Sedimentation Rate; Standing    UA / M With / Rflx Culture([facility] ONLY) - Urine, Clean Catch

## 2025-06-10 NOTE — ASSESSMENT & PLAN NOTE
Stress fracture L foot 2/19.  Stress Fracture Sacrum 3/2024  Started  Prolia with pcp June 2019.   She was off Prolia for 1 year, she restarted this and suffered some compression fractures. Prolia should be indefinite because if she d/c this DEXA will decrease.   DEXA 10/23 T score -2.9 lumbar spine, -3.3 L femoral neck. Consistent with severe osteoporosis of the femoral neck and osteoporosis of the spine. No comparisons or statistical analysis from 2021. ? worsening of the femoral neck  Reported Jaw pain, dentist aware - following for TMJ, Getting Botox injections for this.   Last injection Jan 2025    Plan:  Continue Prolia. She was noted to have 2 sacral fractures 12/23.  Calcium discontinued due to hypercalcemia   Weight bearing exercise- Patient walking.  Dexa due 10/2025 with pcp  Previously seen at  [facility].   Next dose due 7/2025  SmartLink not supported outside of the [facility]counter [facility] SmartSection.

## 2025-06-10 NOTE — ASSESSMENT & PLAN NOTE
Patient receives IM steroids Q 2-4 months for years.  She has occasional taken rounds of IV steroids.   Her last round of steroids were in October 2019.  She has not required them since using rituximab.  She is trying not to take steroids but does not feel well; Aches all over.  Positive fatigue.  In the past she has not tolerated oral steroids. I do have some concern that she could have secondary adrenal insufficiency.  Chronic steroid can delay healing of fractures or wounds; Can increase blood count and make her more predisposed to thrush/yeast infections.    Plan:  Try to stay off steroids.

## 2025-06-10 NOTE — ASSESSMENT & PLAN NOTE
Follows with Dr. Felder-  Neurology   Hx numbness going up bilateral legs into the groin with low back pain. Reported hx of weakness in the hands and cannot . She is taking gabapentin 300 mg QHS routinely but has a RX for 300mg TID.   S/p Arava - did not tolerate with rash and fever.   Currently on mtx and Rituxan.     Plan:  Hand weakness and persistent numbness.  Continue Rituxan.

## 2025-06-10 NOTE — PROGRESS NOTES
Office Follow Up      Date: 06/11/2025   Patient Name: Mesha Najera  MRN: [id number]  YOB: 1960    Referring Physician: No ref. provider found     Chief Complaint: Lupus and rheumatoid arthritis.      History of Present Illness: Mesha Najera is a 65 y.o. female who is here today for follow up on lupus and rheumatoid arthritis.  She is doing well.   Rituximab is effective for lupus/Sjogren's, RA, MS, and myasthenia gravis.   No significant joint swelling.  No side effects from infusions.  Pain level is low at 3. EMS is 1 hr.   She received a Prolia injection on 1/01/2025 at the [facility] and is due for Rituxan in 08/2025.   She reports that Rituxan has significantly improved her Sjogren's syndrome, as evidenced by less dryness in her eyes, a symptom she has not experienced in over 20 years.   She struggles with Raynaud's phenomenon in her hands.  She has tolerated the methotrexate well without side effects.  She has had no side effects from her Prolia injections.  She is following with Dr. Vega and Dr. Felder.    She also has leukopenia, myasthenia gravis, and multiple sclerosis.  [facility] no longer visits [facility], but was advised to return if required. She continues to experience numbness in her legs and occasional muscle spasms in her feet.   Had volvulus in November. She had resection at . Doing well now.   Eye exam is up to date.     Subjective   Review of Systems: Review of Systems   Respiratory:  Positive for chest tightness and shortness of breath.    Gastrointestinal:  Positive for GERD.   Musculoskeletal:  Positive for back pain, gait problem and neck pain.   Skin:  Positive for rash and bruise.   Allergic/Immunologic: Positive for immunocompromised state.   Neurological:  Positive for dizziness and numbness.   Hematological:  Bruises/bleeds easily.        Medications:   Current Outpatient Medications:     aspirin 81 MG EC tablet, Take 1 tablet by mouth  "Daily., Disp: , Rfl:     atorvastatin (LIPITOR) 20 MG tablet, Take 1 tablet by mouth Daily., Disp: , Rfl:     colchicine 0.6 MG tablet, Take 1 tablet by mouth 2 (Two) Times a Day., Disp: 60 tablet, Rfl: 2    cyclobenzaprine (FLEXERIL) 10 MG tablet, Take 1 tablet by mouth 3 times a day., Disp: 90 tablet, Rfl: 2    Dietary Management Product (Rheumate) capsule, Take 1 capsule by mouth 2 (Two) Times a Day., Disp: 180 capsule, Rfl: 1    FT Arthritis Pain 1 % gel gel, apply two grams topically TO affected AREA FOUR TIMES DAILY, Disp: , Rfl:     gabapentin (NEURONTIN) 300 MG capsule, Take 1 capsule by mouth Daily., Disp: , Rfl:     hydroxychloroquine (PLAQUENIL) 200 MG tablet, Take 1 tablet by mouth Daily., Disp: 60 tablet, Rfl: 3    Insulin Syringe/Needle (B-D INS SYR MICROFINE 1CC/28G) 28G X 1/2\" 1 ML misc, Use 1 syringe 1 (One) Time Per Week. 1 syringe as directed for mtx injection, Disp: 10 each, Rfl: 3    Methotrexate Sodium 50 MG/2ML injection, Inject 0.4 mL under the skin into the appropriate area as directed 1 (One) Time Per Week., Disp: 4 mL, Rfl: 2    pantoprazole (PROTONIX) 40 MG EC tablet, Take 2 tablets by mouth Daily., Disp: , Rfl:     pyridostigmine (MESTINON) 60 MG tablet, Take 3 tablets by mouth Daily., Disp: , Rfl:     RITUXIMAB IV, Infuse  into a venous catheter Every 4 (Four) Months. 2 infusions q 4 months, infusions are 2 weeks apart, Disp: , Rfl:     riTUXimab-pvvr (Ruxience) 100 MG/10ML solution injection, Infuse  into a venous catheter See Admin Instructions. Administer RUXIENCE 1000 every 4 months, Disp: , Rfl:     temazepam (RESTORIL) 15 MG capsule, Take 1 capsule by mouth At Night As Needed for Sleep., Disp: , Rfl:     Allergies:   Allergies   Allergen Reactions    Milnacipran Hcl [Milnacipran] Unknown - High Severity    Penicillins Swelling    Arava [Leflunomide] Rash    Cellcept [Mycophenolate] Rash    Dienogest Rash     SIBILLA    Pregabalin Rash    Sulfa Antibiotics Rash       I have " "reviewed and updated the patient's chief complaint, history of present illness, review of systems, past medical history, surgical history, family history, social history, medications and allergy list as appropriate.     Objective    Vital Signs:   Vitals:    06/11/25 1423   BP: 160/80   Pulse: 75   Temp: 97.5 °F (36.4 °C)   Weight: 50.7 kg (111 lb 12.8 oz)   Height: 172.7 cm (68\")   PainSc: 4    PainLoc: Generalized     Body mass index is 17 kg/m².    Physical Exam:  General: The patient is well-developed and well nourished. Cooperative, alert and oriented x3. Affect is normal. Hydration appears normal.   HEENT: Normocephalic and atraumatic. No notable alopecia. Lids and conjunctiva are normal. Pupils are equal and sclera are clear. Oropharynx is clear   NECK: Supple without adenopathy, masses or thyromegaly.   CARDIOVASCULAR: Regular rate and rhythm. No murmurs, rubs or gallops   LUNGS: Effort is normal. Lungs are clear bilaterally.  ABDOMEN: Soft and non-tender without masses or hepatosplenomegaly..   EXTREMITIES: Trace edema.  No cyanosis or clubbing.  SKIN: Inspection and palpation are normal.  NEUROLOGIC: Gait is normal.    MUSCULOSKELETAL: Complete joint exam was performed. There was full range of motion of the shoulders, elbows, wrists and hands without soft tissue swelling synovitis or deformities except as noted.  There is squaring of the first CMC joint.  Hips have good flexion and internal and external rotation.  Knees have no palpable effusions.  There is full extension and flexion.  Ankles have no soft tissue swelling or synovitis.  BACK:  Straight without scoliosis.  Neck has decreased range of motion.  All fibromyalgia tender points are present.  Joint Exam 06/11/2025     The following joints were examined and normal:   Left Glenohumeral, Right Glenohumeral, Left Elbow, Right Elbow, Left Wrist, Right Wrist, Left MCP 1, Right MCP 1, Left MCP 2, Right MCP 2, Left MCP 3, Right MCP 3, Left MCP 4, Right " MCP 4, Left MCP 5, Right MCP 5, Left IP (thumb), Right IP (thumb), Left PIP 2 (finger), Right PIP 2 (finger), Left PIP 3 (finger), Right PIP 3 (finger), Left PIP 4 (finger), Right PIP 4 (finger), Left PIP 5 (finger), Right PIP 5 (finger), Left Knee, Right Knee       Patient Global: 30 / 100  Provider Global: 0 / 100      Assessment / Plan      Assessment & Plan  Systemic lupus erythematosus, unspecified SLE type, unspecified organ involvement status  +JOSUE 1:320, + SSA/SSB, RF=31; pleurisy, oral sores(increased w/mtx), Sjogren's, +JOSUE, leucopenia/anemia w/normal bm bx. Raynaud's, inflammatory arthritis. have had steroids, plaquenil, mtx, Arava(rash/fever), imuran (rash fever) CellCept (rash/fever)    Took Benlysta x 1 year without relief.;  ;  sx better on mtx/plaquenil with improvement in jt pain/stiffness but still has pleurisy--in past took decadron injxn w/pcp - Last reported dose Feb 2025  On Rheumate Bid     Joint pain overall has improved on Rituxan.  She has no active rash, alopecia, joint swelling, fevers, or oral ulcers.  Patient global is 3/10.  Continue Plaquenil.  Continue Mtx and Rituxan.  Continue Rheumate for oral ulcers.  She has had mild leukopenia. We stopped Plaquenil without change. We decreased mtx without change. Probably lupus related, and she saw oncology-see plan below    I reviewed her labs from last month and they were acceptable.  I will see her in follow-up in 3 months.    Orders:    CBC Auto Differential; Standing    Comprehensive Metabolic Panel; Standing    C-reactive Protein; Standing    Sedimentation Rate; Standing    UA / M With / Rflx Culture([facility] ONLY) - Urine, Clean Catch       Rheumatoid arthritis of multiple sites with negative rheumatoid factor  Intermittent joint pain and swelling.; +RF  S/p Plaquenil, mtx, Arava, imuran, CellCept, Benlysta, now on Rituxan.   Previous XR of the knees showed chondrocalcinosis (2021) -  Currently on Colchicine   Previous XR of the pelvic  showed DJD of the R hip and SI joints.  Knee pain  - multifactorial- Oa, CPPD, SLE/RA;   Colchicine helps her knee which is consistent with CPPD / Chondrocalcinosis. She reportedly also needs knee replacements, getting gel and steroid injections to avoid replacement at this time.   MCP swelling resolved with plaquenil/ Rituxan.  Hand film in June 2022- Osteoarthritis of multiple joints.  Avoid TNFs due to lupus. Avoid Orencia - chest xr consistent with COPD.   Swollen joint count is: 0, Tender joint count is: 0, Physician global is: 0, VAS is: 3, Patient global is: 3  CDAI is 3 - Low Disease Activity .  Continue Plaquenil  Continue methotrexate.   Continue Rituxan.   Continues on colchicine for CPPD  She is rotating gel and steroid injections for OA of knees with Dr. Bello.  She has had some R TMJ popping and locking (botox injections with dentist). She needs to continue to see her dentist for evaluation.  Standing lab order given.          Sjogren's syndrome, with unspecified organ involvement  Stable---using eye drops and could not get Numoisyn; not much relief w/salagen/evoxac  Biotene gum ;using Xylimelts! Has severe eye dryness- Has not tolerated Restasis etc.    She appears stable according to her history.  Continue rituximab.  She feels it helps.  Continue oasis spray, Xylimelts, Biotene as they seem to help with sx.          High risk medication use  Rituxan and mtx - well tolerated and effective.  Hydroxychloroquine.  10/21 Negative Hepatitis.   12/22 Qfn neg.  CXR 12/21- Emphysema.  Hx. Neutropenia-stabilized see below.       Eye Check Q 12 mos with OCT for Plaquenil- 4/2025  Cbc,Cmp Q 2 months -   Would hold methotrexate/Rituxan for any infection or illness, Seek treatment and when well and illness is resolved you may restart medication.           Long term (current) use of systemic steroids  Patient receives IM steroids Q 2-4 months for years.  She has occasional taken rounds of IV steroids.   Her last  round of steroids were in October 2019.  She has not required them since using rituximab.  She is trying not to take steroids but does not feel well; Aches all over.  Positive fatigue.  In the past she has not tolerated oral steroids. I do have some concern that she could have secondary adrenal insufficiency.  Chronic steroid can delay healing of fractures or wounds; Can increase blood count and make her more predisposed to thrush/yeast infections.    Plan:  Try to stay off steroids.          Fibromyalgia  Chronic muscle pain---no relief w/meds; POOR SLEEP.  Currently stable with intermittent sx.  Decreased gabapentin from Dr. Felder to bedtime.  FMS packet given and discussed - pt to discuss this with PCP.  Stable.-Symptoms wax and wane    Plan:  Continue flexeril.   Heat, ice, massage, Biofreeze.   Water aerobics, chair aerobics, exercising- YouTube. She is walking for exercise.          Raynaud's disease without gangrene  Intermittent.-Flares with cold weather.  She has not tolerated medications for this in the past.    Keep Extremities warm with socks and Gloves.  Consider electric socks and gloves.  Hand warmers           Myasthenia gravis  Follows with Dr. Felder-  Neurology   Hx numbness going up bilateral legs into the groin with low back pain. Reported hx of weakness in the hands and cannot . She is taking gabapentin 300 mg QHS routinely but has a RX for 300mg TID.   S/p Arava - did not tolerate with rash and fever.   Currently on mtx and Rituxan.     Plan:  Hand weakness and persistent numbness.  Continue Rituxan.           Chronic right-sided low back pain without sciatica  Lumbar spine MRI 6/2021- Mild R sided neuroforaminal narrowing at L3-4 due to bulging disc. She does have some DDD of L3- S1..          Other fatigue         Multiple sclerosis  Confirmed Dr. Felder.  He  wanted us to consider med that would treat both her lupus as well as her MS.  S/p Arava - did not tolerate with rash and  fever.   Currently on mtx and Rituxan.   Pt does not think MS has changed at this point.     Hand weakness and persistent numbness. Electric shocks per pt.   Continue Rituxan.  Dr. Felder would like her to continue rituximab.         Primary osteoarthritis of knees, bilateral  Followed by [facility] - Dr. Bello  Bilateral hylan (Synvisc) injections 1/22/25  Has had Bilateral steroid injections previously.   Has an appointment for synvisc injections 7/25         Neutropenia, unspecified type  Followed by Dr Vega.        Rash  Macular erythema of the mcp joints into the fingers.  H/o lupus.  No known history of dermatomyositis.   Currently on Rituxan which would treat lupus.    Plan: As per dermatology             Other osteoporosis without current pathological fracture  Stress fracture L foot 2/19.  Stress Fracture Sacrum 3/2024  Started  Prolia with pcp June 2019.   She was off Prolia for 1 year, she restarted this and suffered some compression fractures. Prolia should be indefinite because if she d/c this DEXA will decrease.   DEXA 10/23 T score -2.9 lumbar spine, -3.3 L femoral neck. Consistent with severe osteoporosis of the femoral neck and osteoporosis of the spine. No comparisons or statistical analysis from 2021. ? worsening of the femoral neck  Reported Jaw pain, dentist aware - following for TMJ, Getting Botox injections for this.   Last injection Jan 2025    She gets Prolia through her primary care provider.  Continue Prolia. She was noted to have 2 sacral fractures 12/23.  Calcium discontinued due to hypercalcemia   Weight bearing exercise- Patient walking.  Dexa due 10/2025 with pcp  Previously seen at  bone clinic.   Next dose due 7/2025       Calcium pyrophosphate deposition disease (CPPD)  Records.  Associated with hand and knee pain.  She does well with colchicine.            Follow Up:   Return in about 3 months (around 9/11/2025).        Brent Villareal MD  [facility]

## 2025-06-10 NOTE — ASSESSMENT & PLAN NOTE
Confirmed Dr. Felder.  He  wanted us to consider med that would treat both her lupus as well as her MS.  S/p Arava - did not tolerate with rash and fever.   Currently on mtx and Rituxan.   Pt does not think MS has changed at this point.     Hand weakness and persistent numbness. Electric shocks per pt.   Continue Rituxan.  Dr. Felder would like her to continue rituximab.

## 2025-06-11 ENCOUNTER — OFFICE VISIT (OUTPATIENT)
Age: 65
End: 2025-06-11
Payer: MEDICARE

## 2025-06-11 VITALS
TEMPERATURE: 97.5 F | DIASTOLIC BLOOD PRESSURE: 80 MMHG | SYSTOLIC BLOOD PRESSURE: 160 MMHG | HEIGHT: 68 IN | HEART RATE: 75 BPM | BODY MASS INDEX: 16.94 KG/M2 | WEIGHT: 111.8 LBS

## 2025-06-11 DIAGNOSIS — I73.00 RAYNAUD'S DISEASE WITHOUT GANGRENE: ICD-10-CM

## 2025-06-11 DIAGNOSIS — M32.9 SYSTEMIC LUPUS ERYTHEMATOSUS, UNSPECIFIED SLE TYPE, UNSPECIFIED ORGAN INVOLVEMENT STATUS: Primary | ICD-10-CM

## 2025-06-11 DIAGNOSIS — M54.50 CHRONIC RIGHT-SIDED LOW BACK PAIN WITHOUT SCIATICA: ICD-10-CM

## 2025-06-11 DIAGNOSIS — D70.9 NEUTROPENIA, UNSPECIFIED TYPE: ICD-10-CM

## 2025-06-11 DIAGNOSIS — M81.8 OTHER OSTEOPOROSIS WITHOUT CURRENT PATHOLOGICAL FRACTURE: ICD-10-CM

## 2025-06-11 DIAGNOSIS — Z79.899 HIGH RISK MEDICATION USE: ICD-10-CM

## 2025-06-11 DIAGNOSIS — M11.20 CALCIUM PYROPHOSPHATE DEPOSITION DISEASE (CPPD): ICD-10-CM

## 2025-06-11 DIAGNOSIS — R21 RASH: ICD-10-CM

## 2025-06-11 DIAGNOSIS — M06.09 RHEUMATOID ARTHRITIS OF MULTIPLE SITES WITH NEGATIVE RHEUMATOID FACTOR: ICD-10-CM

## 2025-06-11 DIAGNOSIS — M35.00 SJOGREN'S SYNDROME, WITH UNSPECIFIED ORGAN INVOLVEMENT: ICD-10-CM

## 2025-06-11 DIAGNOSIS — Z79.52 LONG TERM (CURRENT) USE OF SYSTEMIC STEROIDS: ICD-10-CM

## 2025-06-11 DIAGNOSIS — G35 MULTIPLE SCLEROSIS: ICD-10-CM

## 2025-06-11 DIAGNOSIS — M79.7 FIBROMYALGIA: ICD-10-CM

## 2025-06-11 DIAGNOSIS — R53.83 OTHER FATIGUE: ICD-10-CM

## 2025-06-11 DIAGNOSIS — G89.29 CHRONIC RIGHT-SIDED LOW BACK PAIN WITHOUT SCIATICA: ICD-10-CM

## 2025-06-11 DIAGNOSIS — M17.0 PRIMARY OSTEOARTHRITIS OF KNEES, BILATERAL: ICD-10-CM

## 2025-06-11 DIAGNOSIS — G70.00 MYASTHENIA GRAVIS: ICD-10-CM

## 2025-06-11 RX ORDER — METHOTREXATE 25 MG/ML
10 INJECTION, SOLUTION INTRAMUSCULAR; INTRATHECAL; INTRAVENOUS; SUBCUTANEOUS WEEKLY
Qty: 4 ML | Refills: 2 | Status: SHIPPED | OUTPATIENT
Start: 2025-06-11

## 2025-06-11 RX ORDER — HYDROXYCHLOROQUINE SULFATE 200 MG/1
200 TABLET, FILM COATED ORAL DAILY
Qty: 60 TABLET | Refills: 3 | Status: SHIPPED | OUTPATIENT
Start: 2025-06-11

## 2025-06-11 RX ORDER — ME-TETRAHYDROFOLATE/B12/HRB236 1-1-500 MG
1 CAPSULE ORAL 2 TIMES DAILY
Qty: 180 CAPSULE | Refills: 1 | Status: SHIPPED | OUTPATIENT
Start: 2025-06-11

## 2025-06-11 RX ORDER — DICLOFENAC POTASSIUM 50 MG/1
1 TABLET, FILM COATED ORAL WEEKLY
Qty: 10 EACH | Refills: 3 | Status: SHIPPED | OUTPATIENT
Start: 2025-06-11

## 2025-06-11 RX ORDER — COLCHICINE 0.6 MG/1
0.6 TABLET ORAL 2 TIMES DAILY
Qty: 60 TABLET | Refills: 2 | Status: SHIPPED | OUTPATIENT
Start: 2025-06-11

## 2025-06-23 ENCOUNTER — RESULTS FOLLOW-UP (OUTPATIENT)
Age: 65
End: 2025-06-23
Payer: MEDICARE

## 2025-07-18 RX ORDER — HYDROXYCHLOROQUINE SULFATE 200 MG/1
200 TABLET, FILM COATED ORAL DAILY
Qty: 60 TABLET | Refills: 3 | Status: SHIPPED | OUTPATIENT
Start: 2025-07-18

## 2025-08-26 ENCOUNTER — TELEPHONE (OUTPATIENT)
Age: 65
End: 2025-08-26
Payer: MEDICARE

## 2025-08-26 DIAGNOSIS — M06.09 RHEUMATOID ARTHRITIS OF MULTIPLE SITES WITH NEGATIVE RHEUMATOID FACTOR: Primary | ICD-10-CM

## 2025-08-26 RX ORDER — ACETAMINOPHEN 325 MG/1
650 TABLET ORAL ONCE
OUTPATIENT
Start: 2025-09-03

## 2025-08-26 RX ORDER — METHYLPREDNISOLONE SODIUM SUCCINATE 125 MG/2ML
125 INJECTION, POWDER, LYOPHILIZED, FOR SOLUTION INTRAMUSCULAR; INTRAVENOUS ONCE
Start: 2025-09-03

## 2025-08-26 RX ORDER — LORATADINE 10 MG/1
10 TABLET ORAL ONCE
Start: 2025-09-03

## 2025-08-26 RX ORDER — SODIUM CHLORIDE 9 MG/ML
20 INJECTION, SOLUTION INTRAVENOUS ONCE
OUTPATIENT
Start: 2025-09-03

## 2025-08-26 RX ORDER — FAMOTIDINE 10 MG/ML
20 INJECTION, SOLUTION INTRAVENOUS AS NEEDED
OUTPATIENT
Start: 2025-09-03

## 2025-08-26 RX ORDER — DIPHENHYDRAMINE HYDROCHLORIDE 50 MG/ML
50 INJECTION, SOLUTION INTRAMUSCULAR; INTRAVENOUS AS NEEDED
OUTPATIENT
Start: 2025-09-03